# Patient Record
Sex: FEMALE | Race: WHITE | NOT HISPANIC OR LATINO
[De-identification: names, ages, dates, MRNs, and addresses within clinical notes are randomized per-mention and may not be internally consistent; named-entity substitution may affect disease eponyms.]

---

## 2022-02-10 ENCOUNTER — TRANSCRIPTION ENCOUNTER (OUTPATIENT)
Age: 47
End: 2022-02-10

## 2023-01-24 ENCOUNTER — APPOINTMENT (OUTPATIENT)
Dept: INTERNAL MEDICINE | Facility: CLINIC | Age: 48
End: 2023-01-24

## 2023-01-27 ENCOUNTER — APPOINTMENT (OUTPATIENT)
Dept: INTERNAL MEDICINE | Facility: CLINIC | Age: 48
End: 2023-01-27

## 2023-02-04 ENCOUNTER — NON-APPOINTMENT (OUTPATIENT)
Age: 48
End: 2023-02-04

## 2023-02-04 ENCOUNTER — APPOINTMENT (OUTPATIENT)
Dept: INTERNAL MEDICINE | Facility: CLINIC | Age: 48
End: 2023-02-04
Payer: COMMERCIAL

## 2023-02-04 VITALS
RESPIRATION RATE: 14 BRPM | DIASTOLIC BLOOD PRESSURE: 82 MMHG | HEART RATE: 61 BPM | HEIGHT: 65 IN | OXYGEN SATURATION: 99 % | SYSTOLIC BLOOD PRESSURE: 130 MMHG | BODY MASS INDEX: 33.99 KG/M2 | TEMPERATURE: 98.2 F | WEIGHT: 204 LBS

## 2023-02-04 DIAGNOSIS — M54.16 RADICULOPATHY, LUMBAR REGION: ICD-10-CM

## 2023-02-04 DIAGNOSIS — M22.42 CHONDROMALACIA PATELLAE, LEFT KNEE: ICD-10-CM

## 2023-02-04 DIAGNOSIS — Z82.49 FAMILY HISTORY OF ISCHEMIC HEART DISEASE AND OTHER DISEASES OF THE CIRCULATORY SYSTEM: ICD-10-CM

## 2023-02-04 DIAGNOSIS — Z83.3 FAMILY HISTORY OF DIABETES MELLITUS: ICD-10-CM

## 2023-02-04 PROCEDURE — 93000 ELECTROCARDIOGRAM COMPLETE: CPT | Mod: 59

## 2023-02-04 PROCEDURE — G0444 DEPRESSION SCREEN ANNUAL: CPT | Mod: 59

## 2023-02-04 PROCEDURE — 99386 PREV VISIT NEW AGE 40-64: CPT | Mod: 25

## 2023-02-04 NOTE — PLAN
[FreeTextEntry1] : HCM \par - Up to date with pap smear \par - Due for mammogram- script provided \par - Up to date with flu vaccine \par - Due for colonoscopy- referred to colorectal surgery Dr Nowak \par - Check routine labs \par \par Type 2 DM \par - Pt counselled on importance of compliance with medication, can find alternate timing for medication if need be\par - Check A1c, urine microalbumin \par - Referred to optho for retinal exam, podiatry for foot exam \par \par Bradycardia \par - Reports remote history earlier in the summer, experiences intermittent lightheadedness \par - EKG shows sinus bradycardia, HR 49, low voltage. Pt displays chronotropic competence, able to walk around and heart rate increases to 60s \par - Referred to cardiology at [facility] \par \par

## 2023-02-04 NOTE — PHYSICAL EXAM
[No JVD] : no jugular venous distention [No Lymphadenopathy] : no lymphadenopathy [Supple] : supple [Thyroid Normal, No Nodules] : the thyroid was normal and there were no nodules present [No Edema] : there was no peripheral edema [Soft] : abdomen soft [Non Tender] : non-tender [Non-distended] : non-distended [Normal] : affect was normal and insight and judgment were intact [de-identified] : +post surgical scar noted on anterior neck

## 2023-02-04 NOTE — HEALTH RISK ASSESSMENT
[Good] : ~his/her~  mood as  good [No] : No [0] : 2) Feeling down, depressed, or hopeless: Not at all (0) [Patient reported mammogram was normal] : Patient reported mammogram was normal [Patient reported PAP Smear was normal] : Patient reported PAP Smear was normal [With Family] : lives with family [Employed] : employed [] :  [# Of Children ___] : has [unfilled] children [Fully functional (bathing, dressing, toileting, transferring, walking, feeding)] : Fully functional (bathing, dressing, toileting, transferring, walking, feeding) [Fully functional (using the telephone, shopping, preparing meals, housekeeping, doing laundry, using] : Fully functional and needs no help or supervision to perform IADLs (using the telephone, shopping, preparing meals, housekeeping, doing laundry, using transportation, managing medications and managing finances) [Never] : Never [[id number]] : 0 [MammogramDate] : 01/21 [PapSmearDate] : 08/22

## 2023-02-04 NOTE — HISTORY OF PRESENT ILLNESS
[FreeTextEntry1] : establish care  [de-identified] : Patient is a 47 year old female with PMH type 2 DM who presents today to establish care. Patient states that she has not been very compliant with her diabetes medication as of late as she tends to not have time to eat and therefore does not take her medication as when she takes it without eating it makes her have stomach pains and diarrhea. \par \par Patient also reports a remote history of bradycardia. She states she was told her heart rate was low postoperatively after her cervical spine fusion in the summer of 2022. She does not feel any skipped beats but does occasionally feel lightheaded.

## 2023-02-06 LAB
25(OH)D3 SERPL-MCNC: 20.8 NG/ML
ALBUMIN SERPL ELPH-MCNC: 4.1 G/DL
ALP BLD-CCNC: 93 U/L
ALT SERPL-CCNC: 17 U/L
ANION GAP SERPL CALC-SCNC: 12 MMOL/L
APPEARANCE: ABNORMAL
AST SERPL-CCNC: 14 U/L
BACTERIA: NEGATIVE
BASOPHILS # BLD AUTO: 0.06 K/UL
BASOPHILS NFR BLD AUTO: 0.6 %
BILIRUB SERPL-MCNC: 0.7 MG/DL
BILIRUBIN URINE: NEGATIVE
BLOOD URINE: ABNORMAL
BUN SERPL-MCNC: 7 MG/DL
CALCIUM SERPL-MCNC: 9.1 MG/DL
CHLORIDE SERPL-SCNC: 100 MMOL/L
CHOLEST SERPL-MCNC: 202 MG/DL
CO2 SERPL-SCNC: 23 MMOL/L
COLOR: ABNORMAL
CREAT SERPL-MCNC: 0.58 MG/DL
CREAT SPEC-SCNC: 232 MG/DL
EGFR: 114 ML/MIN/1.73M2
EOSINOPHIL # BLD AUTO: 0.31 K/UL
EOSINOPHIL NFR BLD AUTO: 3 %
ESTIMATED AVERAGE GLUCOSE: 214 MG/DL
FOLATE SERPL-MCNC: 6.9 NG/ML
GLUCOSE QUALITATIVE U: ABNORMAL
GLUCOSE SERPL-MCNC: 188 MG/DL
HBA1C MFR BLD HPLC: 9.1 %
HCT VFR BLD CALC: 41.6 %
HDLC SERPL-MCNC: 41 MG/DL
HGB BLD-MCNC: 13.3 G/DL
HYALINE CASTS: 1 /LPF
IMM GRANULOCYTES NFR BLD AUTO: 0.5 %
KETONES URINE: NORMAL
LDLC SERPL CALC-MCNC: 141 MG/DL
LEUKOCYTE ESTERASE URINE: NEGATIVE
LYMPHOCYTES # BLD AUTO: 2.05 K/UL
LYMPHOCYTES NFR BLD AUTO: 20.1 %
MAN DIFF?: NORMAL
MCHC RBC-ENTMCNC: 28.4 PG
MCHC RBC-ENTMCNC: 32 GM/DL
MCV RBC AUTO: 88.7 FL
MICROALBUMIN 24H UR DL<=1MG/L-MCNC: 5.3 MG/DL
MICROALBUMIN/CREAT 24H UR-RTO: 23 MG/G
MICROSCOPIC-UA: NORMAL
MONOCYTES # BLD AUTO: 0.68 K/UL
MONOCYTES NFR BLD AUTO: 6.7 %
NEUTROPHILS # BLD AUTO: 7.05 K/UL
NEUTROPHILS NFR BLD AUTO: 69.1 %
NITRITE URINE: NEGATIVE
NONHDLC SERPL-MCNC: 161 MG/DL
PH URINE: 5.5
PLATELET # BLD AUTO: 391 K/UL
POTASSIUM SERPL-SCNC: 4.3 MMOL/L
PROT SERPL-MCNC: 7 G/DL
PROTEIN URINE: ABNORMAL
RBC # BLD: 4.69 M/UL
RBC # FLD: 14.3 %
RED BLOOD CELLS URINE: 1 /HPF
SODIUM SERPL-SCNC: 135 MMOL/L
SPECIFIC GRAVITY URINE: 1.03
SQUAMOUS EPITHELIAL CELLS: 3 /HPF
TRIGL SERPL-MCNC: 100 MG/DL
TSH SERPL-ACNC: 0.57 UIU/ML
URINE COMMENTS: NORMAL
UROBILINOGEN URINE: NORMAL
VIT B12 SERPL-MCNC: 416 PG/ML
WBC # FLD AUTO: 10.2 K/UL
WHITE BLOOD CELLS URINE: 1 /HPF

## 2023-02-06 RX ORDER — MELOXICAM 15 MG/1
15 TABLET ORAL
Qty: 30 | Refills: 0 | Status: DISCONTINUED | COMMUNITY
Start: 2022-10-06

## 2023-02-06 RX ORDER — METHYLPREDNISOLONE 4 MG/1
4 TABLET ORAL
Qty: 21 | Refills: 0 | Status: DISCONTINUED | COMMUNITY
Start: 2022-09-07

## 2023-02-06 RX ORDER — METHOCARBAMOL 500 MG/1
500 TABLET, FILM COATED ORAL
Qty: 30 | Refills: 0 | Status: DISCONTINUED | COMMUNITY
Start: 2022-10-06

## 2023-02-06 RX ORDER — SEMAGLUTIDE 1.34 MG/ML
4 INJECTION, SOLUTION SUBCUTANEOUS
Refills: 0 | Status: DISCONTINUED | COMMUNITY
Start: 2023-02-04 | End: 2023-02-06

## 2023-02-06 RX ORDER — BENZONATATE 100 MG/1
100 CAPSULE ORAL
Qty: 30 | Refills: 0 | Status: DISCONTINUED | COMMUNITY
Start: 2022-11-02

## 2023-02-06 RX ORDER — MULTIVITAMIN WITH FOLIC ACID 400 MCG
TABLET ORAL
Qty: 30 | Refills: 0 | Status: DISCONTINUED | COMMUNITY
Start: 2022-04-30

## 2023-02-06 RX ORDER — BLOOD SUGAR DIAGNOSTIC
STRIP MISCELLANEOUS
Qty: 100 | Refills: 0 | Status: DISCONTINUED | COMMUNITY
Start: 2022-07-13

## 2023-02-06 RX ORDER — AZITHROMYCIN 250 MG/1
250 TABLET, FILM COATED ORAL
Qty: 6 | Refills: 0 | Status: DISCONTINUED | COMMUNITY
Start: 2022-11-03

## 2023-02-06 RX ORDER — LISINOPRIL 2.5 MG/1
2.5 TABLET ORAL
Qty: 30 | Refills: 0 | Status: DISCONTINUED | COMMUNITY
Start: 2022-08-24

## 2023-02-06 RX ORDER — ERGOCALCIFEROL 1.25 MG/1
1.25 MG CAPSULE, LIQUID FILLED ORAL
Qty: 4 | Refills: 0 | Status: DISCONTINUED | COMMUNITY
Start: 2022-04-30

## 2023-02-06 RX ORDER — CHLORHEXIDINE GLUCONATE 4 %
1000 LIQUID (ML) TOPICAL
Qty: 30 | Refills: 0 | Status: DISCONTINUED | COMMUNITY
Start: 2022-04-30

## 2023-02-06 RX ORDER — OXYCODONE AND ACETAMINOPHEN 5; 325 MG/1; MG/1
5-325 TABLET ORAL
Qty: 50 | Refills: 0 | Status: DISCONTINUED | COMMUNITY
Start: 2022-08-16

## 2023-04-05 ENCOUNTER — APPOINTMENT (OUTPATIENT)
Dept: INTERNAL MEDICINE | Facility: CLINIC | Age: 48
End: 2023-04-05
Payer: COMMERCIAL

## 2023-04-05 VITALS
BODY MASS INDEX: 34.32 KG/M2 | OXYGEN SATURATION: 98 % | SYSTOLIC BLOOD PRESSURE: 138 MMHG | RESPIRATION RATE: 14 BRPM | WEIGHT: 206 LBS | HEIGHT: 65 IN | DIASTOLIC BLOOD PRESSURE: 80 MMHG | HEART RATE: 64 BPM

## 2023-04-05 DIAGNOSIS — R22.9 LOCALIZED SWELLING, MASS AND LUMP, UNSPECIFIED: ICD-10-CM

## 2023-04-05 DIAGNOSIS — L29.9 PRURITUS, UNSPECIFIED: ICD-10-CM

## 2023-04-05 DIAGNOSIS — L30.9 DERMATITIS, UNSPECIFIED: ICD-10-CM

## 2023-04-05 PROCEDURE — 99213 OFFICE O/P EST LOW 20 MIN: CPT

## 2023-04-05 NOTE — REVIEW OF SYSTEMS
[Negative] : Psychiatric [FreeTextEntry8] : see hpi  [de-identified] : see hpi  [FreeTextEntry9] : see hpi

## 2023-04-05 NOTE — PLAN
[FreeTextEntry1] : Nodules on hand:unclear etiology, possibly secondary to ganglion cysts vs heberdens nodes vs soft tissue lipomas. Will obtain Xray of hand\par \par Vaginal itching: likely secondary to yeast infection. Start diflucan. \par \par Dermatitis/ear itching: possibly secondary to eczema, ?psoriasiis flareup. Start triamcinolone cream. F/u derm if no improvement.

## 2023-04-05 NOTE — PHYSICAL EXAM
[Normal TMs] : both tympanic membranes were normal [Normal] : affect was normal and insight and judgment were intact [de-identified] : dry patch noted on tragus of left ear [de-identified] : small soft nodules noted on left fourth digit and second digit of left hand

## 2023-04-05 NOTE — HISTORY OF PRESENT ILLNESS
[FreeTextEntry8] : 48 year old female who presents today with a few concerns. She states that she has noticed some nodules on the fingers of her left hand. She states that it has been going on and off for several months. She states they are not painful, occasionally she has some trouble bending the fingers but otherwise is able to do so. \par \par Patient has also noticed a dry itchy rash on ear lobe and tragal area. This has been going on for several months. She uses hydrocortisone cream with minimal improvement. \par \par Patient states she has been having vaginal itching. Has history of yeast infections.

## 2023-04-26 ENCOUNTER — APPOINTMENT (OUTPATIENT)
Dept: INTERNAL MEDICINE | Facility: CLINIC | Age: 48
End: 2023-04-26
Payer: COMMERCIAL

## 2023-04-26 VITALS
TEMPERATURE: 98.1 F | RESPIRATION RATE: 14 BRPM | DIASTOLIC BLOOD PRESSURE: 70 MMHG | BODY MASS INDEX: 34.32 KG/M2 | HEART RATE: 66 BPM | HEIGHT: 65 IN | WEIGHT: 206 LBS | OXYGEN SATURATION: 98 % | SYSTOLIC BLOOD PRESSURE: 120 MMHG

## 2023-04-26 DIAGNOSIS — M62.830 MUSCLE SPASM OF BACK: ICD-10-CM

## 2023-04-26 DIAGNOSIS — Z87.898 PERSONAL HISTORY OF OTHER SPECIFIED CONDITIONS: ICD-10-CM

## 2023-04-26 DIAGNOSIS — Z23 ENCOUNTER FOR IMMUNIZATION: ICD-10-CM

## 2023-04-26 PROCEDURE — 99213 OFFICE O/P EST LOW 20 MIN: CPT | Mod: 25

## 2023-04-26 PROCEDURE — 90471 IMMUNIZATION ADMIN: CPT

## 2023-04-26 PROCEDURE — 90715 TDAP VACCINE 7 YRS/> IM: CPT

## 2023-04-26 RX ORDER — FLUCONAZOLE 150 MG/1
150 TABLET ORAL
Qty: 2 | Refills: 0 | Status: DISCONTINUED | COMMUNITY
Start: 2023-04-05 | End: 2023-04-26

## 2023-04-26 RX ORDER — TRIAMCINOLONE ACETONIDE 1 MG/G
0.1 CREAM TOPICAL 3 TIMES DAILY
Qty: 1 | Refills: 0 | Status: DISCONTINUED | COMMUNITY
Start: 2023-04-05 | End: 2023-04-26

## 2023-04-26 NOTE — HISTORY OF PRESENT ILLNESS
[FreeTextEntry8] : 48 year old female who presents today with complaint of lower back pain. It started about 2 weeks ago. It is localized to her left lower back. Does not radiate anywhere. Denies fevers, chills, numbness, tingling, saddle anesthesia, bowel or bladder incontinence. She took Aleve with minimal relief. She is mostly sitting during the day at work. \par \par Patient would like Tdap vaccine today.

## 2023-04-26 NOTE — PLAN
[FreeTextEntry1] : Symptoms consistent with lower back muscle spasm. Start Flexeril 10 mg TID PRN, naproxen 500 mg BID and lidocaine patch. Follow up if no improvement. \par \par Tdap administered in office today, no adverse reactions.

## 2023-04-26 NOTE — PHYSICAL EXAM
[No Edema] : there was no peripheral edema [No CVA Tenderness] : no CVA  tenderness [No Spinal Tenderness] : no spinal tenderness [Normal] : affect was normal and insight and judgment were intact [de-identified] : increased hypertonicity of left lumbar paraspinal muscles

## 2023-08-03 ENCOUNTER — APPOINTMENT (OUTPATIENT)
Dept: DERMATOLOGY | Facility: CLINIC | Age: 48
End: 2023-08-03
Payer: COMMERCIAL

## 2023-08-03 DIAGNOSIS — L82.1 OTHER SEBORRHEIC KERATOSIS: ICD-10-CM

## 2023-08-03 DIAGNOSIS — L40.9 PSORIASIS, UNSPECIFIED: ICD-10-CM

## 2023-08-03 DIAGNOSIS — L23.5 ALLERGIC CONTACT DERMATITIS DUE TO OTHER CHEMICAL PRODUCTS: ICD-10-CM

## 2023-08-03 PROCEDURE — 99204 OFFICE O/P NEW MOD 45 MIN: CPT

## 2023-08-03 NOTE — HISTORY OF PRESENT ILLNESS
[FreeTextEntry1] : rash; psoriasis [de-identified] : 48F with a hx of psoriasis here for rash on the neck and L ear. Present x months. Itchy. No new topical contacts. Uses hair dye Also with a hx of psoriasis -- previously treated with ILK. Has one area on the R knee that is itchy and flaring.  Also curious about bumps on the face that are increasing in number.

## 2023-08-03 NOTE — PHYSICAL EXAM
[Alert] : alert [Oriented x 3] : ~L oriented x 3 [Well Nourished] : well nourished [Conjunctiva Non-injected] : conjunctiva non-injected [No Visual Lymphadenopathy] : no visual  lymphadenopathy [No Clubbing] : no clubbing [No Edema] : no edema [No Bromhidrosis] : no bromhidrosis [No Chromhidrosis] : no chromhidrosis [FreeTextEntry3] : Stuck-on brown papules on the face Lichenified plaques on the ear and neck R knee with well-circumscribed erythematous plaque

## 2023-08-07 ENCOUNTER — APPOINTMENT (OUTPATIENT)
Dept: INTERNAL MEDICINE | Facility: CLINIC | Age: 48
End: 2023-08-07
Payer: COMMERCIAL

## 2023-08-07 VITALS
HEART RATE: 74 BPM | BODY MASS INDEX: 34.99 KG/M2 | TEMPERATURE: 97.9 F | RESPIRATION RATE: 14 BRPM | WEIGHT: 210 LBS | HEIGHT: 65 IN | OXYGEN SATURATION: 97 % | DIASTOLIC BLOOD PRESSURE: 70 MMHG | SYSTOLIC BLOOD PRESSURE: 120 MMHG

## 2023-08-07 PROCEDURE — 99214 OFFICE O/P EST MOD 30 MIN: CPT

## 2023-08-07 NOTE — HISTORY OF PRESENT ILLNESS
[FreeTextEntry8] : Pt would like to decrease the dose of ozempic due to GI side effects. Pt reports that her new insurance doesn't cover farxiga. She hasn't tolerated metformin in the past

## 2023-09-06 ENCOUNTER — APPOINTMENT (OUTPATIENT)
Dept: INTERNAL MEDICINE | Facility: CLINIC | Age: 48
End: 2023-09-06

## 2023-09-12 ENCOUNTER — RX RENEWAL (OUTPATIENT)
Age: 48
End: 2023-09-12

## 2023-10-26 ENCOUNTER — APPOINTMENT (OUTPATIENT)
Dept: DERMATOLOGY | Facility: CLINIC | Age: 48
End: 2023-10-26

## 2023-11-03 ENCOUNTER — APPOINTMENT (OUTPATIENT)
Dept: OBGYN | Facility: CLINIC | Age: 48
End: 2023-11-03
Payer: COMMERCIAL

## 2023-11-03 VITALS
BODY MASS INDEX: 34.16 KG/M2 | HEIGHT: 65 IN | WEIGHT: 205 LBS | DIASTOLIC BLOOD PRESSURE: 78 MMHG | SYSTOLIC BLOOD PRESSURE: 120 MMHG

## 2023-11-03 DIAGNOSIS — Z01.419 ENCOUNTER FOR GYNECOLOGICAL EXAMINATION (GENERAL) (ROUTINE) W/OUT ABNORMAL FINDINGS: ICD-10-CM

## 2023-11-03 DIAGNOSIS — N89.8 OTHER SPECIFIED NONINFLAMMATORY DISORDERS OF VAGINA: ICD-10-CM

## 2023-11-03 PROCEDURE — 99386 PREV VISIT NEW AGE 40-64: CPT

## 2023-11-03 RX ORDER — NYSTATIN AND TRIAMCINOLONE ACETONIDE 100000; 1 [USP'U]/G; MG/G
100000-0.1 OINTMENT TOPICAL TWICE DAILY
Qty: 1 | Refills: 0 | Status: ACTIVE | COMMUNITY
Start: 2023-11-03 | End: 1900-01-01

## 2023-11-05 LAB — HPV HIGH+LOW RISK DNA PNL CVX: NOT DETECTED

## 2023-11-07 LAB — CYTOLOGY CVX/VAG DOC THIN PREP: NORMAL

## 2024-01-17 ENCOUNTER — RX RENEWAL (OUTPATIENT)
Age: 49
End: 2024-01-17

## 2024-02-21 ENCOUNTER — APPOINTMENT (OUTPATIENT)
Dept: INTERNAL MEDICINE | Facility: CLINIC | Age: 49
End: 2024-02-21
Payer: COMMERCIAL

## 2024-02-21 VITALS
SYSTOLIC BLOOD PRESSURE: 118 MMHG | HEART RATE: 86 BPM | TEMPERATURE: 98.3 F | OXYGEN SATURATION: 99 % | RESPIRATION RATE: 14 BRPM | DIASTOLIC BLOOD PRESSURE: 70 MMHG | HEIGHT: 65 IN | WEIGHT: 202 LBS | BODY MASS INDEX: 33.66 KG/M2

## 2024-02-21 DIAGNOSIS — E78.5 HYPERLIPIDEMIA, UNSPECIFIED: ICD-10-CM

## 2024-02-21 DIAGNOSIS — E11.9 TYPE 2 DIABETES MELLITUS W/OUT COMPLICATIONS: ICD-10-CM

## 2024-02-21 DIAGNOSIS — E55.9 VITAMIN D DEFICIENCY, UNSPECIFIED: ICD-10-CM

## 2024-02-21 DIAGNOSIS — Z00.00 ENCOUNTER FOR GENERAL ADULT MEDICAL EXAMINATION W/OUT ABNORMAL FINDINGS: ICD-10-CM

## 2024-02-21 LAB
25(OH)D3 SERPL-MCNC: 15.6 NG/ML
ALBUMIN SERPL ELPH-MCNC: 3.9 G/DL
ALP BLD-CCNC: 83 U/L
ALT SERPL-CCNC: 17 U/L
ANION GAP SERPL CALC-SCNC: 11 MMOL/L
AST SERPL-CCNC: 12 U/L
BILIRUB SERPL-MCNC: 0.7 MG/DL
BUN SERPL-MCNC: 6 MG/DL
CALCIUM SERPL-MCNC: 8.7 MG/DL
CHLORIDE SERPL-SCNC: 103 MMOL/L
CHOLEST SERPL-MCNC: 191 MG/DL
CO2 SERPL-SCNC: 25 MMOL/L
CREAT SERPL-MCNC: 0.58 MG/DL
EGFR: 113 ML/MIN/1.73M2
ESTIMATED AVERAGE GLUCOSE: 169 MG/DL
FOLATE SERPL-MCNC: 4.7 NG/ML
GLUCOSE SERPL-MCNC: 124 MG/DL
HBA1C MFR BLD HPLC: 7.5 %
HCT VFR BLD CALC: 40.2 %
HDLC SERPL-MCNC: 52 MG/DL
HGB BLD-MCNC: 12.7 G/DL
LDLC SERPL CALC-MCNC: 123 MG/DL
MCHC RBC-ENTMCNC: 27.4 PG
MCHC RBC-ENTMCNC: 31.6 GM/DL
MCV RBC AUTO: 86.8 FL
NONHDLC SERPL-MCNC: 139 MG/DL
PLATELET # BLD AUTO: 397 K/UL
POTASSIUM SERPL-SCNC: 4.1 MMOL/L
PROT SERPL-MCNC: 6.9 G/DL
RBC # BLD: 4.63 M/UL
RBC # FLD: 14.7 %
SODIUM SERPL-SCNC: 139 MMOL/L
TRIGL SERPL-MCNC: 89 MG/DL
TSH SERPL-ACNC: 0.97 UIU/ML
VIT B12 SERPL-MCNC: 428 PG/ML
WBC # FLD AUTO: 9.86 K/UL

## 2024-02-21 PROCEDURE — 99396 PREV VISIT EST AGE 40-64: CPT

## 2024-02-21 RX ORDER — CYCLOBENZAPRINE HYDROCHLORIDE 10 MG/1
10 TABLET, FILM COATED ORAL 3 TIMES DAILY
Qty: 21 | Refills: 0 | Status: DISCONTINUED | COMMUNITY
Start: 2023-04-26 | End: 2024-02-21

## 2024-02-21 RX ORDER — ROSUVASTATIN CALCIUM 10 MG/1
10 TABLET, FILM COATED ORAL
Qty: 90 | Refills: 3 | Status: ACTIVE | COMMUNITY
Start: 2023-02-06 | End: 1900-01-01

## 2024-02-21 RX ORDER — EMPAGLIFLOZIN 25 MG/1
25 TABLET, FILM COATED ORAL DAILY
Qty: 90 | Refills: 1 | Status: ACTIVE | COMMUNITY
Start: 2024-02-21 | End: 1900-01-01

## 2024-02-21 RX ORDER — LIDOCAINE 5% 700 MG/1
5 PATCH TOPICAL
Qty: 7 | Refills: 0 | Status: DISCONTINUED | COMMUNITY
Start: 2023-04-26 | End: 2024-02-21

## 2024-02-21 RX ORDER — MOMETASONE FUROATE 1 MG/G
0.1 OINTMENT TOPICAL
Qty: 1 | Refills: 1 | Status: DISCONTINUED | COMMUNITY
Start: 2023-08-03 | End: 2024-02-21

## 2024-02-21 RX ORDER — EMPAGLIFLOZIN 10 MG/1
10 TABLET, FILM COATED ORAL DAILY
Qty: 30 | Refills: 0 | Status: DISCONTINUED | COMMUNITY
Start: 2023-08-04 | End: 2024-02-21

## 2024-02-21 RX ORDER — NAPROXEN 500 MG/1
500 TABLET ORAL
Qty: 20 | Refills: 0 | Status: DISCONTINUED | COMMUNITY
Start: 2023-04-26 | End: 2024-02-21

## 2024-04-29 ENCOUNTER — RX RENEWAL (OUTPATIENT)
Age: 49
End: 2024-04-29

## 2024-04-29 RX ORDER — SEMAGLUTIDE 0.68 MG/ML
2 INJECTION, SOLUTION SUBCUTANEOUS
Qty: 3 | Refills: 0 | Status: ACTIVE | COMMUNITY
Start: 2023-02-06 | End: 1900-01-01

## 2024-04-29 RX ORDER — ERGOCALCIFEROL 1.25 MG/1
1.25 MG CAPSULE, LIQUID FILLED ORAL
Qty: 8 | Refills: 0 | Status: ACTIVE | COMMUNITY
Start: 2024-02-21 | End: 1900-01-01

## 2024-07-11 ENCOUNTER — RX RENEWAL (OUTPATIENT)
Age: 49
End: 2024-07-11

## 2024-08-08 ENCOUNTER — RX RENEWAL (OUTPATIENT)
Age: 49
End: 2024-08-08

## 2024-08-22 ENCOUNTER — APPOINTMENT (OUTPATIENT)
Dept: INTERNAL MEDICINE | Facility: CLINIC | Age: 49
End: 2024-08-22
Payer: COMMERCIAL

## 2024-08-22 VITALS
WEIGHT: 205 LBS | TEMPERATURE: 97.9 F | HEART RATE: 61 BPM | SYSTOLIC BLOOD PRESSURE: 146 MMHG | DIASTOLIC BLOOD PRESSURE: 82 MMHG | BODY MASS INDEX: 34.16 KG/M2 | RESPIRATION RATE: 14 BRPM | OXYGEN SATURATION: 99 % | HEIGHT: 65 IN

## 2024-08-22 VITALS — SYSTOLIC BLOOD PRESSURE: 122 MMHG | DIASTOLIC BLOOD PRESSURE: 64 MMHG

## 2024-08-22 DIAGNOSIS — E11.9 TYPE 2 DIABETES MELLITUS W/OUT COMPLICATIONS: ICD-10-CM

## 2024-08-22 DIAGNOSIS — R51.9 HEADACHE, UNSPECIFIED: ICD-10-CM

## 2024-08-22 DIAGNOSIS — E78.5 HYPERLIPIDEMIA, UNSPECIFIED: ICD-10-CM

## 2024-08-22 PROCEDURE — 99214 OFFICE O/P EST MOD 30 MIN: CPT

## 2024-08-22 PROCEDURE — G2211 COMPLEX E/M VISIT ADD ON: CPT

## 2024-08-22 RX ORDER — NAPROXEN 500 MG/1
500 TABLET ORAL
Qty: 30 | Refills: 0 | Status: ACTIVE | COMMUNITY
Start: 2024-08-22 | End: 1900-01-01

## 2024-08-22 NOTE — ASSESSMENT
[FreeTextEntry1] : HLD: Check lipids. Urged her to restart rosuvastatin 10mg daily.  DM2: Check a1c, alb/cr. Continue jardiance 25mg daily, ozempic .5mg weekly.  Headache: No focal signs. Discussed naproxen 500mg BID with food for 3 days. Tylenol 1000mg TID PRN. Follow-up if persistent. Check labs to rule out metabolic causes.

## 2024-08-22 NOTE — REVIEW OF SYSTEMS
[Fever] : no fever [Chills] : no chills [Night Sweats] : no night sweats [Chest Pain] : no chest pain [Palpitations] : no palpitations [Lower Ext Edema] : no lower extremity edema [Shortness Of Breath] : no shortness of breath [Wheezing] : no wheezing [Cough] : no cough [Dyspnea on Exertion] : no dyspnea on exertion [Abdominal Pain] : no abdominal pain [Nausea] : no nausea [Constipation] : no constipation [Diarrhea] : diarrhea [Vomiting] : no vomiting [Dysuria] : no dysuria [Headache] : headache [Dizziness] : no dizziness

## 2024-08-22 NOTE — HISTORY OF PRESENT ILLNESS
[de-identified] : 49F presents for follow-up of DM2, HLD. Has been taking ozempic and jardiance. Stopped taking rosuvastatin. Due for bloodwork. Also reports 1 week of headache. The headache is centered around the eyes. Denies dizziness, vertigo, weakness, numbness. Has tried OTC analgesics which have not helped.

## 2024-08-26 LAB
ALBUMIN SERPL ELPH-MCNC: 4.2 G/DL
ALP BLD-CCNC: 91 U/L
ALT SERPL-CCNC: 20 U/L
ANION GAP SERPL CALC-SCNC: 12 MMOL/L
AST SERPL-CCNC: 15 U/L
BASOPHILS # BLD AUTO: 0.08 K/UL
BASOPHILS NFR BLD AUTO: 0.8 %
BILIRUB SERPL-MCNC: 0.4 MG/DL
BUN SERPL-MCNC: 8 MG/DL
CALCIUM SERPL-MCNC: 9.4 MG/DL
CHLORIDE SERPL-SCNC: 104 MMOL/L
CHOLEST SERPL-MCNC: 234 MG/DL
CO2 SERPL-SCNC: 23 MMOL/L
CREAT SERPL-MCNC: 0.58 MG/DL
CREAT SPEC-SCNC: 78 MG/DL
EGFR: 112 ML/MIN/1.73M2
EOSINOPHIL # BLD AUTO: 0.36 K/UL
EOSINOPHIL NFR BLD AUTO: 3.7 %
ESTIMATED AVERAGE GLUCOSE: 180 MG/DL
FOLATE SERPL-MCNC: 5.4 NG/ML
GLUCOSE SERPL-MCNC: 123 MG/DL
HBA1C MFR BLD HPLC: 7.9 %
HCT VFR BLD CALC: 44 %
HDLC SERPL-MCNC: 53 MG/DL
HGB BLD-MCNC: 14 G/DL
IMM GRANULOCYTES NFR BLD AUTO: 0.7 %
LDLC SERPL CALC-MCNC: 156 MG/DL
LYMPHOCYTES # BLD AUTO: 2.42 K/UL
LYMPHOCYTES NFR BLD AUTO: 25.1 %
MAN DIFF?: NORMAL
MCHC RBC-ENTMCNC: 27.7 PG
MCHC RBC-ENTMCNC: 31.8 GM/DL
MCV RBC AUTO: 87.1 FL
MICROALBUMIN 24H UR DL<=1MG/L-MCNC: 1.9 MG/DL
MICROALBUMIN/CREAT 24H UR-RTO: 25 MG/G
MONOCYTES # BLD AUTO: 0.7 K/UL
MONOCYTES NFR BLD AUTO: 7.3 %
NEUTROPHILS # BLD AUTO: 6.02 K/UL
NEUTROPHILS NFR BLD AUTO: 62.4 %
NONHDLC SERPL-MCNC: 180 MG/DL
PLATELET # BLD AUTO: 405 K/UL
POTASSIUM SERPL-SCNC: 4.4 MMOL/L
PROT SERPL-MCNC: 7.5 G/DL
RBC # BLD: 5.05 M/UL
RBC # FLD: 15 %
SODIUM SERPL-SCNC: 139 MMOL/L
TRIGL SERPL-MCNC: 136 MG/DL
TSH SERPL-ACNC: 1.13 UIU/ML
VIT B12 SERPL-MCNC: 382 PG/ML
WBC # FLD AUTO: 9.65 K/UL

## 2024-09-22 ENCOUNTER — INPATIENT (INPATIENT)
Facility: HOSPITAL | Age: 49
LOS: 1 days | Discharge: HOME | DRG: 866 | End: 2024-09-24
Attending: STUDENT IN AN ORGANIZED HEALTH CARE EDUCATION/TRAINING PROGRAM | Admitting: INTERNAL MEDICINE
Payer: COMMERCIAL

## 2024-09-22 VITALS
RESPIRATION RATE: 16 BRPM | OXYGEN SATURATION: 99 % | HEART RATE: 96 BPM | DIASTOLIC BLOOD PRESSURE: 63 MMHG | SYSTOLIC BLOOD PRESSURE: 117 MMHG | HEIGHT: 65 IN | TEMPERATURE: 102 F | WEIGHT: 205.91 LBS

## 2024-09-22 DIAGNOSIS — R65.10 SYSTEMIC INFLAMMATORY RESPONSE SYNDROME (SIRS) OF NON-INFECTIOUS ORIGIN WITHOUT ACUTE ORGAN DYSFUNCTION: ICD-10-CM

## 2024-09-22 LAB
ALBUMIN SERPL ELPH-MCNC: 3.2 G/DL — LOW (ref 3.3–5)
ALP SERPL-CCNC: 87 U/L — SIGNIFICANT CHANGE UP (ref 40–120)
ALT FLD-CCNC: 28 U/L — SIGNIFICANT CHANGE UP (ref 12–78)
ANION GAP SERPL CALC-SCNC: 8 MMOL/L — SIGNIFICANT CHANGE UP (ref 5–17)
APPEARANCE UR: CLEAR — SIGNIFICANT CHANGE UP
APTT BLD: 28.7 SEC — SIGNIFICANT CHANGE UP (ref 24.5–35.6)
AST SERPL-CCNC: 17 U/L — SIGNIFICANT CHANGE UP (ref 15–37)
BILIRUB SERPL-MCNC: 0.6 MG/DL — SIGNIFICANT CHANGE UP (ref 0.2–1.2)
BILIRUB UR-MCNC: NEGATIVE — SIGNIFICANT CHANGE UP
BUN SERPL-MCNC: 9 MG/DL — SIGNIFICANT CHANGE UP (ref 7–23)
CALCIUM SERPL-MCNC: 8.6 MG/DL — SIGNIFICANT CHANGE UP (ref 8.5–10.1)
CHLORIDE SERPL-SCNC: 106 MMOL/L — SIGNIFICANT CHANGE UP (ref 96–108)
CO2 SERPL-SCNC: 24 MMOL/L — SIGNIFICANT CHANGE UP (ref 22–31)
COLOR SPEC: YELLOW — SIGNIFICANT CHANGE UP
CREAT SERPL-MCNC: 0.68 MG/DL — SIGNIFICANT CHANGE UP (ref 0.5–1.3)
DIFF PNL FLD: NEGATIVE — SIGNIFICANT CHANGE UP
EGFR: 108 ML/MIN/1.73M2 — SIGNIFICANT CHANGE UP
GLUCOSE SERPL-MCNC: 184 MG/DL — HIGH (ref 70–99)
GLUCOSE UR QL: >=1000 MG/DL
HCG SERPL-ACNC: <1 MIU/ML — SIGNIFICANT CHANGE UP
HCT VFR BLD CALC: 40.3 % — SIGNIFICANT CHANGE UP (ref 34.5–45)
HGB BLD-MCNC: 12.7 G/DL — SIGNIFICANT CHANGE UP (ref 11.5–15.5)
INR BLD: 1.24 RATIO — HIGH (ref 0.85–1.18)
KETONES UR-MCNC: 15 MG/DL
LACTATE SERPL-SCNC: 1.6 MMOL/L — SIGNIFICANT CHANGE UP (ref 0.7–2)
LACTATE SERPL-SCNC: 2.2 MMOL/L — HIGH (ref 0.7–2)
LACTATE SERPL-SCNC: 2.8 MMOL/L — HIGH (ref 0.7–2)
LEUKOCYTE ESTERASE UR-ACNC: NEGATIVE — SIGNIFICANT CHANGE UP
LIDOCAIN IGE QN: 26 U/L — SIGNIFICANT CHANGE UP (ref 13–75)
MCHC RBC-ENTMCNC: 27.3 PG — SIGNIFICANT CHANGE UP (ref 27–34)
MCHC RBC-ENTMCNC: 31.5 GM/DL — LOW (ref 32–36)
MCV RBC AUTO: 86.5 FL — SIGNIFICANT CHANGE UP (ref 80–100)
NITRITE UR-MCNC: NEGATIVE — SIGNIFICANT CHANGE UP
NRBC # BLD: 0 /100 WBCS — SIGNIFICANT CHANGE UP (ref 0–0)
PH UR: 5 — SIGNIFICANT CHANGE UP (ref 5–8)
PLATELET # BLD AUTO: 274 K/UL — SIGNIFICANT CHANGE UP (ref 150–400)
POTASSIUM SERPL-MCNC: 3.7 MMOL/L — SIGNIFICANT CHANGE UP (ref 3.5–5.3)
POTASSIUM SERPL-SCNC: 3.7 MMOL/L — SIGNIFICANT CHANGE UP (ref 3.5–5.3)
PROT SERPL-MCNC: 7.5 G/DL — SIGNIFICANT CHANGE UP (ref 6–8.3)
PROT UR-MCNC: NEGATIVE MG/DL — SIGNIFICANT CHANGE UP
PROTHROM AB SERPL-ACNC: 14 SEC — HIGH (ref 9.5–13)
RAPID RVP RESULT: SIGNIFICANT CHANGE UP
RBC # BLD: 4.66 M/UL — SIGNIFICANT CHANGE UP (ref 3.8–5.2)
RBC # FLD: 14.5 % — SIGNIFICANT CHANGE UP (ref 10.3–14.5)
SARS-COV-2 RNA SPEC QL NAA+PROBE: SIGNIFICANT CHANGE UP
SODIUM SERPL-SCNC: 138 MMOL/L — SIGNIFICANT CHANGE UP (ref 135–145)
SP GR SPEC: 1.03 — SIGNIFICANT CHANGE UP (ref 1–1.03)
UROBILINOGEN FLD QL: 0.2 MG/DL — SIGNIFICANT CHANGE UP (ref 0.2–1)
WBC # BLD: 10.52 K/UL — HIGH (ref 3.8–10.5)
WBC # FLD AUTO: 10.52 K/UL — HIGH (ref 3.8–10.5)

## 2024-09-22 PROCEDURE — 99285 EMERGENCY DEPT VISIT HI MDM: CPT

## 2024-09-22 PROCEDURE — 76705 ECHO EXAM OF ABDOMEN: CPT | Mod: 26

## 2024-09-22 PROCEDURE — 74177 CT ABD & PELVIS W/CONTRAST: CPT | Mod: 26,MC

## 2024-09-22 PROCEDURE — 71045 X-RAY EXAM CHEST 1 VIEW: CPT | Mod: 26

## 2024-09-22 PROCEDURE — 99223 1ST HOSP IP/OBS HIGH 75: CPT

## 2024-09-22 PROCEDURE — 93010 ELECTROCARDIOGRAM REPORT: CPT

## 2024-09-22 RX ORDER — INFLUENZA VIRUS VACCINE 15; 15; 15; 15 UG/.5ML; UG/.5ML; UG/.5ML; UG/.5ML
0.5 SUSPENSION INTRAMUSCULAR ONCE
Refills: 0 | Status: DISCONTINUED | OUTPATIENT
Start: 2024-09-22 | End: 2024-09-24

## 2024-09-22 RX ORDER — ALCOHOL ANTISEPTIC PADS
25 PADS, MEDICATED (EA) TOPICAL ONCE
Refills: 0 | Status: DISCONTINUED | OUTPATIENT
Start: 2024-09-22 | End: 2024-09-24

## 2024-09-22 RX ORDER — SODIUM CHLORIDE IRRIG SOLUTION 0.9 %
1000 SOLUTION, IRRIGATION IRRIGATION
Refills: 0 | Status: DISCONTINUED | OUTPATIENT
Start: 2024-09-22 | End: 2024-09-24

## 2024-09-22 RX ORDER — ROSUVASTATIN CALCIUM 20 MG/1
10 TABLET, COATED ORAL AT BEDTIME
Refills: 0 | Status: DISCONTINUED | OUTPATIENT
Start: 2024-09-22 | End: 2024-09-24

## 2024-09-22 RX ORDER — SODIUM CHLORIDE 0.9 % (FLUSH) 0.9 %
1000 SYRINGE (ML) INJECTION ONCE
Refills: 0 | Status: COMPLETED | OUTPATIENT
Start: 2024-09-22 | End: 2024-09-22

## 2024-09-22 RX ORDER — METHYLPREDNISOLONE ACETATE 80 MG/ML
125 INJECTION, SUSPENSION INTRA-ARTICULAR; INTRALESIONAL; INTRAMUSCULAR; SOFT TISSUE ONCE
Refills: 0 | Status: COMPLETED | OUTPATIENT
Start: 2024-09-22 | End: 2024-09-22

## 2024-09-22 RX ORDER — GLUCAGON INJECTION, SOLUTION 0.5 MG/.1ML
1 INJECTION, SOLUTION SUBCUTANEOUS ONCE
Refills: 0 | Status: DISCONTINUED | OUTPATIENT
Start: 2024-09-22 | End: 2024-09-24

## 2024-09-22 RX ORDER — INSULIN LISPRO 100/ML
VIAL (ML) SUBCUTANEOUS
Refills: 0 | Status: DISCONTINUED | OUTPATIENT
Start: 2024-09-22 | End: 2024-09-24

## 2024-09-22 RX ORDER — INSULIN LISPRO 100/ML
VIAL (ML) SUBCUTANEOUS AT BEDTIME
Refills: 0 | Status: DISCONTINUED | OUTPATIENT
Start: 2024-09-22 | End: 2024-09-24

## 2024-09-22 RX ORDER — ROSUVASTATIN CALCIUM 20 MG/1
1 TABLET, COATED ORAL
Refills: 0 | DISCHARGE

## 2024-09-22 RX ORDER — SEMAGLUTIDE 1.34 MG/ML
0.5 INJECTION, SOLUTION SUBCUTANEOUS
Refills: 0 | DISCHARGE

## 2024-09-22 RX ORDER — ALCOHOL ANTISEPTIC PADS
15 PADS, MEDICATED (EA) TOPICAL ONCE
Refills: 0 | Status: DISCONTINUED | OUTPATIENT
Start: 2024-09-22 | End: 2024-09-24

## 2024-09-22 RX ORDER — ACETAMINOPHEN 325 MG
1000 TABLET ORAL ONCE
Refills: 0 | Status: COMPLETED | OUTPATIENT
Start: 2024-09-22 | End: 2024-09-22

## 2024-09-22 RX ORDER — OXYCODONE AND ACETAMINOPHEN 5; 325 MG/1; MG/1
1 TABLET ORAL EVERY 4 HOURS
Refills: 0 | Status: DISCONTINUED | OUTPATIENT
Start: 2024-09-22 | End: 2024-09-22

## 2024-09-22 RX ORDER — ACETAMINOPHEN 325 MG
650 TABLET ORAL EVERY 6 HOURS
Refills: 0 | Status: DISCONTINUED | OUTPATIENT
Start: 2024-09-22 | End: 2024-09-24

## 2024-09-22 RX ORDER — AMPICILLIN, SULBACTAM 250; 125 MG/ML; MG/ML
3 INJECTION, POWDER, FOR SOLUTION INTRAMUSCULAR; INTRAVENOUS EVERY 6 HOURS
Refills: 0 | Status: DISCONTINUED | OUTPATIENT
Start: 2024-09-22 | End: 2024-09-22

## 2024-09-22 RX ORDER — EMPAGLIFLOZIN 25 MG/1
1 TABLET, FILM COATED ORAL
Refills: 0 | DISCHARGE

## 2024-09-22 RX ORDER — ONDANSETRON HCL/PF 4 MG/2 ML
4 VIAL (ML) INJECTION ONCE
Refills: 0 | Status: COMPLETED | OUTPATIENT
Start: 2024-09-22 | End: 2024-09-22

## 2024-09-22 RX ORDER — PIPERACILLIN SODIUM AND TAZOBACTAM SODIUM 12; 1.5 G/60ML; G/60ML
3.38 INJECTION, POWDER, LYOPHILIZED, FOR SOLUTION INTRAVENOUS ONCE
Refills: 0 | Status: COMPLETED | OUTPATIENT
Start: 2024-09-22 | End: 2024-09-22

## 2024-09-22 RX ORDER — DIPHENHYDRAMINE HCL 12.5MG/5ML
50 LIQUID (ML) ORAL ONCE
Refills: 0 | Status: COMPLETED | OUTPATIENT
Start: 2024-09-22 | End: 2024-09-22

## 2024-09-22 RX ORDER — ALCOHOL ANTISEPTIC PADS
12.5 PADS, MEDICATED (EA) TOPICAL ONCE
Refills: 0 | Status: DISCONTINUED | OUTPATIENT
Start: 2024-09-22 | End: 2024-09-24

## 2024-09-22 RX ADMIN — METHYLPREDNISOLONE ACETATE 125 MILLIGRAM(S): 80 INJECTION, SUSPENSION INTRA-ARTICULAR; INTRALESIONAL; INTRAMUSCULAR; SOFT TISSUE at 10:44

## 2024-09-22 RX ADMIN — Medication 1000 MILLILITER(S): at 05:24

## 2024-09-22 RX ADMIN — Medication 1000 MILLILITER(S): at 09:11

## 2024-09-22 RX ADMIN — Medication 1000 MILLILITER(S): at 07:11

## 2024-09-22 RX ADMIN — Medication 5000 UNIT(S): at 14:31

## 2024-09-22 RX ADMIN — Medication 1000 MILLILITER(S): at 04:24

## 2024-09-22 RX ADMIN — PIPERACILLIN SODIUM AND TAZOBACTAM SODIUM 3.38 GRAM(S): 12; 1.5 INJECTION, POWDER, LYOPHILIZED, FOR SOLUTION INTRAVENOUS at 09:11

## 2024-09-22 RX ADMIN — Medication 400 MILLIGRAM(S): at 04:24

## 2024-09-22 RX ADMIN — Medication 1: at 17:03

## 2024-09-22 RX ADMIN — Medication 1000 MILLIGRAM(S): at 10:04

## 2024-09-22 RX ADMIN — Medication 4 MILLIGRAM(S): at 09:20

## 2024-09-22 RX ADMIN — PIPERACILLIN SODIUM AND TAZOBACTAM SODIUM 200 GRAM(S): 12; 1.5 INJECTION, POWDER, LYOPHILIZED, FOR SOLUTION INTRAVENOUS at 07:11

## 2024-09-22 RX ADMIN — Medication 1000 MILLIGRAM(S): at 04:39

## 2024-09-22 RX ADMIN — ROSUVASTATIN CALCIUM 10 MILLIGRAM(S): 20 TABLET, COATED ORAL at 22:12

## 2024-09-22 RX ADMIN — Medication 50 MILLIGRAM(S): at 10:44

## 2024-09-22 RX ADMIN — Medication 400 MILLIGRAM(S): at 09:22

## 2024-09-22 RX ADMIN — Medication 5000 UNIT(S): at 22:12

## 2024-09-22 NOTE — H&P ADULT - NSHPPHYSICALEXAM_GEN_ALL_CORE
T(C): 37.7 (09-22-24 @ 10:04), Max: 38.8 (09-22-24 @ 03:43)  HR: 82 (09-22-24 @ 10:04) (82 - 96)  BP: 107/60 (09-22-24 @ 10:04) (95/60 - 117/63)  RR: 16 (09-22-24 @ 10:04) (16 - 18)  SpO2: 97% (09-22-24 @ 10:04) (95% - 99%)  Wt(kg): --    Physical Exam:   GENERAL: well-groomed, well-developed, NAD  HEENT: head NC/AT; EOM intact, PERRLA, conjunctiva & sclera clear; hearing grossly intact, moist mucous membranes  NECK: supple, no JVD  RESPIRATORY: CTA B/L, no wheezing, rales, rhonchi or rubs  CARDIOVASCULAR: S1&S2, RRR, no murmurs or gallops  ABDOMEN: soft, non-tender, non-distended, + Bowel sounds x4 quadrants, no guarding, rebound or rigidity  MUSCULOSKELETAL:  no clubbing, cyanosis or edema of all 4 extremities  LYMPH: no cervical lymphadenopathy  VASCULAR: Radial pulses 2+ bilaterally, no varicose veins   SKIN: warm and dry, color normal  NEUROLOGIC: AA&O X3, MAEx4, negative kernig and brudzinski sin T(C): 37.7 (09-22-24 @ 10:04), Max: 38.8 (09-22-24 @ 03:43)  HR: 82 (09-22-24 @ 10:04) (82 - 96)  BP: 107/60 (09-22-24 @ 10:04) (95/60 - 117/63)  RR: 16 (09-22-24 @ 10:04) (16 - 18)  SpO2: 97% (09-22-24 @ 10:04) (95% - 99%)  Wt(kg): --    Physical Exam:   GENERAL: well-groomed, well-developed, NAD  HEENT: head NC/AT; EOM intact, PERRLA, conjunctiva & sclera clear; hearing grossly intact, moist mucous membranes  NECK: supple, no JVD  RESPIRATORY: CTA B/L, no wheezing, rales, rhonchi or rubs  CARDIOVASCULAR: S1&S2, RRR, no murmurs or gallops  ABDOMEN: soft, non-tender, non-distended, + Bowel sounds x4 quadrants, no guarding, rebound or rigidity  MUSCULOSKELETAL:  no clubbing, cyanosis or edema of all 4 extremities  LYMPH: no cervical lymphadenopathy  VASCULAR: Radial pulses 2+ bilaterally, no varicose veins   SKIN: warm and dry, color normal. NO erythema noted of RLE where she had varicose vein procedure  NEUROLOGIC: AA&O X3, MAEx4, negative kernig and brudzinski sin

## 2024-09-22 NOTE — ED ADULT NURSE NOTE - NSFALLRISKASMTTYPE_ED_ALL_ED
PT had knee replacement one year ago. PT has been seeing dermatologist for a rash right next to scar incision. PT wants to know does he need to be antibiotics for the biopsy scheduled and PT says the Dermatologist also talked about giving him a steroid injection where the flair up is located    Initial (On Arrival)

## 2024-09-22 NOTE — CONSULT NOTE ADULT - SUBJECTIVE AND OBJECTIVE BOX
HPI:  MS Traore is a 48 yo F PMH DM2, HLD, varicose veins who presented to the hospital with c/o fevers chills. Had sore throat and cough. She feels weak all over and also states she has myalgias. Had  headache but resolved. She had a RLE ( thigh ) varicose vein ablation about 4 weeks ago, she had some erythema and pain c/w cellulitis and was prescribed a course of levaquin In ED Tmax 101.8 wbc 10K. CXR clear CT AP no acute pathology. UA neg. Nontoxic looking.    Infectious Disease consult was called to evaluate pt.      Past Medical & Surgical Hx:  PAST MEDICAL & SURGICAL HISTORY:  Fibroids    No significant past surgical history        Social History--  EtOH: denies   Tobacco: denies   Drug Use: denies       FAMILY HISTORY:  No pertinent family history in first degree relatives      Allergies  Zosyn (Rash)    Intolerances  NONE      Home Medications:  Jardiance 10 mg oral tablet: 1 tab(s) orally once a day (22 Sep 2024 15:18)  Ozempic 2 mg/1.5 mL (0.25 mg or 0.5 mg dose) subcutaneous solution: 0.5 milligram(s) subcutaneously once a week (22 Sep 2024 15:19)  rosuvastatin 10 mg oral capsule: 1 cap(s) orally once a day (22 Sep 2024 15:18)      Current Inpatient Medications :    ANTIBIOTICS:       OTHER RELEVANT MEDICATIONS :  acetaminophen     Tablet .. 650 milliGRAM(s) Oral every 6 hours PRN  dextrose 5%. 1000 milliLiter(s) IV Continuous <Continuous>  dextrose 5%. 1000 milliLiter(s) IV Continuous <Continuous>  dextrose 50% Injectable 25 Gram(s) IV Push once  dextrose 50% Injectable 25 Gram(s) IV Push once  dextrose 50% Injectable 12.5 Gram(s) IV Push once  dextrose Oral Gel 15 Gram(s) Oral once PRN  glucagon  Injectable 1 milliGRAM(s) IntraMuscular once  heparin   Injectable 5000 Unit(s) SubCutaneous every 8 hours  influenza   Vaccine 0.5 milliLiter(s) IntraMuscular once  insulin lispro (ADMELOG) corrective regimen sliding scale   SubCutaneous at bedtime  insulin lispro (ADMELOG) corrective regimen sliding scale   SubCutaneous three times a day before meals  rosuvastatin 10 milliGRAM(s) Oral at bedtime      ROS:  CONSTITUTIONAL: + fever or chills  EYES:  Negative  blurry vision or double vision  CARDIOVASCULAR:  Negative for chest pain or palpitations  RESPIRATORY:  Negative for cough, wheezing, or SOB   GASTROINTESTINAL:  Negative for nausea, vomiting, diarrhea, constipation, or abdominal pain  GENITOURINARY:  Negative frequency, urgency , dysuria or hematuria   NEUROLOGIC:  No headache, confusion, dizziness, lightheadedness  All other systems were reviewed and are negative      Physical Exam:  Vital Signs Last 24 Hrs  T(C): 36.7 (22 Sep 2024 18:32), Max: 38.8 (22 Sep 2024 03:43)  T(F): 98.1 (22 Sep 2024 18:32), Max: 101.8 (22 Sep 2024 03:43)  HR: 72 (22 Sep 2024 18:32) (61 - 96)  BP: 110/67 (22 Sep 2024 18:32) (95/60 - 117/63)  BP(mean): --  RR: 19 (22 Sep 2024 18:32) (16 - 19)  SpO2: 94% (22 Sep 2024 18:32) (94% - 99%)    Parameters below as of 22 Sep 2024 18:32  Patient On (Oxygen Delivery Method): room air      Height (cm): 165.1 (09-22 @ 03:43)  Weight (kg): 93.4 (09-22 @ 03:43)  BMI (kg/m2): 34.3 (09-22 @ 03:43)  BSA (m2): 2 (09-22 @ 03:43)    General: no acute distress  Neck: supple, trachea midline  Lungs: clear, no wheeze/rhonchi  Cardiovascular: regular rate and rhythm, S1 S2  Abdomen: soft, nontender, ND, bowel sounds normal  Neurological:  alert and oriented x3  Skin: no rash  Extremities: no edema    Labs:                         12.7   10.52 )-----------( 274      ( 22 Sep 2024 04:35 )             40.3   09-22    138  |  106  |  9   ----------------------------<  184[H]  3.7   |  24  |  0.68    Ca    8.6      22 Sep 2024 04:35    TPro  7.5  /  Alb  3.2[L]  /  TBili  0.6  /  DBili  x   /  AST  17  /  ALT  28  /  AlkPhos  87  09-22    Urinalysis with Rflx Culture (09.22.24 @ 06:55)    Urine Appearance: Clear   Color: Yellow   Specific Gravity: 1.030   pH Urine: 5.0   Protein, Urine: Negative mg/dL   Glucose Qualitative, Urine: >=1000 mg/dL   Ketone - Urine: 15 mg/dL   Blood, Urine: Negative   Bilirubin: Negative   Urobilinogen: 0.2 mg/dL   Leukocyte Esterase Concentration: Negative   Nitrite: Negative      RECENT CULTURES:          RADIOLOGY & ADDITIONAL STUDIES:    ACC: [id number] EXAM:  XR CHEST PORTABLE IMMED 1V   ORDERED BY: MICKEY ELDER     PROCEDURE DATE:  09/22/2024          INTERPRETATION:  CLINICAL STATEMENT: Cold, fever, vomiting    TECHNIQUE: AP view of the chest.      COMPARISON: None    The cardiomediastinal silhouette is normal and the bhavin are not enlarged.   The trachea is midline. Suboptimal degree of inspiration. There is no   focal lung consolidation or sizable pleural effusion. No significant   osseous abnormality. Spine fusion hardware noted.    IMPRESSION:    Unremarkable frontal chest x ray      ACC: [id number] EXAM:  CT ABDOMEN AND PELVIS IC   ORDERED BY: MICKEY ELDER     PROCEDURE DATE:  09/22/2024          INTERPRETATION:  CLINICAL INFORMATION: Abdominal pain, fever, and chills.   Right upper quadrant pain per EMR.    COMPARISON: Pelvic ultrasound 11/30/2015    CONTRAST/COMPLICATIONS:  IV Contrast: Omnipaque 350  90 cc administered   10 cc discarded  Oral Contrast: NONE  Complications: None reported at time of study completion    PROCEDURE:  CT of the Abdomen and Pelvis was performed.  Sagittal and coronal reformats were performed.    FINDINGS:    LOWER CHEST: No visualized pleural effusion.    LIVER: Enlarged, 20.7 cm in craniocaudal dimension. Main portal vein and   hepatic veins are patent. Hepatic steatosis.  BILE DUCTS: No distention  GALLBLADDER: Unremarkable CT appearance of the gallbladder. Correlate   with pending ultrasound.  SPLEEN: Spleen size within normal limits  PANCREAS: No acute peripancreatic inflammation  ADRENALS: Left adrenal 6.3 cm myelolipoma. Unremarkable right adrenal.  KIDNEYS/URETERS: No hydronephrosis or obstructing ureteral calculus.    BLADDER: Minimally distended.  REPRODUCTIVE ORGANS: Hysterectomy. Right adnexal 2 cm cyst versus   follicle.    BOWEL: Stomach is underdistended. No small bowel distention. Few   fecalized nondistended small bowel loops are identified. Appendix is not   obstructed. Mild stool burden of the colon and overall under distention   limits evaluation of the colonic mucosa.  PERITONEUM/RETROPERITONEUM: No ascites  VESSELS: No abdominal aortic aneurysm  LYMPH NODES: Small volume nodes not enlarged by CT size criteria.  ABDOMINAL WALL: Anterior abdominal wall laxity/rectus diastases.  BONES: Multilevel spinal degenerative changes.    IMPRESSION:    Etiology of abdominal symptoms is not clearly elucidated.    Enlarged liver with steatosis. Unremarkable CT appearance of the   gallbladder. Correlate with LFTs and pending ultrasound.    Hysterectomy. Right adnexal 2 cm cyst versus follicle.    Left adrenal 6.3 cm myelolipoma.    Assessment :   MS Traore is a 48 yo F PMH DM2, HLD, varicose veins who presented to the hospital with c/o fevers chills In ED Tmax 101.8 wbc 10K. CXR clear CT AP no acute pathology. UA neg. Nontoxic looking.  rule out bacteremia  Drug fever?  Viral?      Plan :   Hold antibiotic for now  Fu cultures  Trend temps and cbc  CT chest  ESR CRP JESSICA      Continue with present regiment .  Approptiate use of antibiotics and adverse effects reviewed.      I have discussed the above plan of care with patient/son in detail. They expressed understanding of the treatment plan . Risks, benefits and alternatives discussed in detail. I have asked if they have any questions or concerns and appropriately addressed them to the best of my ability .      > 45 minutes spent in direct patient care reviewing  the notes, lab data/ imaging , discussion with multidisciplinary team. All questions were addressed and answered to the best of my capacity .    Thank you for allowing me to participate in the care of your patient .      Tomas Khan MD  Infectious Disease  [phone]     HPI:  MS Traore is a 48 yo F PMH DM2, HLD, varicose veins who presented to the hospital with c/o fevers chills. Had sore throat and cough. She feels weak all over and also states she has myalgias. Had  headache but resolved. She had a RLE ( thigh ) varicose vein ablation about 4 weeks ago, she had some erythema and pain c/w cellulitis and was prescribed a course of levaquin In ED Tmax 101.8 wbc 10K. CXR clear CT AP no acute pathology. UA neg. Nontoxic looking.    Infectious Disease consult was called to evaluate pt.      Past Medical & Surgical Hx:  PAST MEDICAL & SURGICAL HISTORY:  Fibroids    No significant past surgical history        Social History--  EtOH: denies   Tobacco: denies   Drug Use: denies       FAMILY HISTORY:  No pertinent family history in first degree relatives      Allergies  Zosyn (Rash)    Intolerances  NONE      Home Medications:  Jardiance 10 mg oral tablet: 1 tab(s) orally once a day (22 Sep 2024 15:18)  Ozempic 2 mg/1.5 mL (0.25 mg or 0.5 mg dose) subcutaneous solution: 0.5 milligram(s) subcutaneously once a week (22 Sep 2024 15:19)  rosuvastatin 10 mg oral capsule: 1 cap(s) orally once a day (22 Sep 2024 15:18)      Current Inpatient Medications :    ANTIBIOTICS:       OTHER RELEVANT MEDICATIONS :  acetaminophen     Tablet .. 650 milliGRAM(s) Oral every 6 hours PRN  dextrose 5%. 1000 milliLiter(s) IV Continuous <Continuous>  dextrose 5%. 1000 milliLiter(s) IV Continuous <Continuous>  dextrose 50% Injectable 25 Gram(s) IV Push once  dextrose 50% Injectable 25 Gram(s) IV Push once  dextrose 50% Injectable 12.5 Gram(s) IV Push once  dextrose Oral Gel 15 Gram(s) Oral once PRN  glucagon  Injectable 1 milliGRAM(s) IntraMuscular once  heparin   Injectable 5000 Unit(s) SubCutaneous every 8 hours  influenza   Vaccine 0.5 milliLiter(s) IntraMuscular once  insulin lispro (ADMELOG) corrective regimen sliding scale   SubCutaneous at bedtime  insulin lispro (ADMELOG) corrective regimen sliding scale   SubCutaneous three times a day before meals  rosuvastatin 10 milliGRAM(s) Oral at bedtime      ROS:  CONSTITUTIONAL: + fever or chills  EYES:  Negative  blurry vision or double vision  CARDIOVASCULAR:  Negative for chest pain or palpitations  RESPIRATORY:  Negative for cough, wheezing, or SOB   GASTROINTESTINAL:  Negative for nausea, vomiting, diarrhea, constipation, or abdominal pain  GENITOURINARY:  Negative frequency, urgency , dysuria or hematuria   NEUROLOGIC:  No headache, confusion, dizziness, lightheadedness  All other systems were reviewed and are negative      Physical Exam:  Vital Signs Last 24 Hrs  T(C): 36.7 (22 Sep 2024 18:32), Max: 38.8 (22 Sep 2024 03:43)  T(F): 98.1 (22 Sep 2024 18:32), Max: 101.8 (22 Sep 2024 03:43)  HR: 72 (22 Sep 2024 18:32) (61 - 96)  BP: 110/67 (22 Sep 2024 18:32) (95/60 - 117/63)  BP(mean): --  RR: 19 (22 Sep 2024 18:32) (16 - 19)  SpO2: 94% (22 Sep 2024 18:32) (94% - 99%)    Parameters below as of 22 Sep 2024 18:32  Patient On (Oxygen Delivery Method): room air      Height (cm): 165.1 (09-22 @ 03:43)  Weight (kg): 93.4 (09-22 @ 03:43)  BMI (kg/m2): 34.3 (09-22 @ 03:43)  BSA (m2): 2 (09-22 @ 03:43)    General: no acute distress  Neck: supple, trachea midline  Lungs: clear, no wheeze/rhonchi  Cardiovascular: regular rate and rhythm, S1 S2  Abdomen: soft, nontender, ND, bowel sounds normal  Neurological:  alert and oriented x3  Skin: no rash  Extremities: no edema    Labs:                         12.7   10.52 )-----------( 274      ( 22 Sep 2024 04:35 )             40.3   09-22    138  |  106  |  9   ----------------------------<  184[H]  3.7   |  24  |  0.68    Ca    8.6      22 Sep 2024 04:35    TPro  7.5  /  Alb  3.2[L]  /  TBili  0.6  /  DBili  x   /  AST  17  /  ALT  28  /  AlkPhos  87  09-22    Urinalysis with Rflx Culture (09.22.24 @ 06:55)    Urine Appearance: Clear   Color: Yellow   Specific Gravity: 1.030   pH Urine: 5.0   Protein, Urine: Negative mg/dL   Glucose Qualitative, Urine: >=1000 mg/dL   Ketone - Urine: 15 mg/dL   Blood, Urine: Negative   Bilirubin: Negative   Urobilinogen: 0.2 mg/dL   Leukocyte Esterase Concentration: Negative   Nitrite: Negative      RECENT CULTURES:          RADIOLOGY & ADDITIONAL STUDIES:    ACC: [id number] EXAM:  XR CHEST PORTABLE IMMED 1V   ORDERED BY: MICKEY ELDER     PROCEDURE DATE:  09/22/2024          INTERPRETATION:  CLINICAL STATEMENT: Cold, fever, vomiting    TECHNIQUE: AP view of the chest.      COMPARISON: None    The cardiomediastinal silhouette is normal and the bhavin are not enlarged.   The trachea is midline. Suboptimal degree of inspiration. There is no   focal lung consolidation or sizable pleural effusion. No significant   osseous abnormality. Spine fusion hardware noted.    IMPRESSION:    Unremarkable frontal chest x ray      ACC: [id number] EXAM:  CT ABDOMEN AND PELVIS IC   ORDERED BY: MICKEY ELDER     PROCEDURE DATE:  09/22/2024          INTERPRETATION:  CLINICAL INFORMATION: Abdominal pain, fever, and chills.   Right upper quadrant pain per EMR.    COMPARISON: Pelvic ultrasound 11/30/2015    CONTRAST/COMPLICATIONS:  IV Contrast: Omnipaque 350  90 cc administered   10 cc discarded  Oral Contrast: NONE  Complications: None reported at time of study completion    PROCEDURE:  CT of the Abdomen and Pelvis was performed.  Sagittal and coronal reformats were performed.    FINDINGS:    LOWER CHEST: No visualized pleural effusion.    LIVER: Enlarged, 20.7 cm in craniocaudal dimension. Main portal vein and   hepatic veins are patent. Hepatic steatosis.  BILE DUCTS: No distention  GALLBLADDER: Unremarkable CT appearance of the gallbladder. Correlate   with pending ultrasound.  SPLEEN: Spleen size within normal limits  PANCREAS: No acute peripancreatic inflammation  ADRENALS: Left adrenal 6.3 cm myelolipoma. Unremarkable right adrenal.  KIDNEYS/URETERS: No hydronephrosis or obstructing ureteral calculus.    BLADDER: Minimally distended.  REPRODUCTIVE ORGANS: Hysterectomy. Right adnexal 2 cm cyst versus   follicle.    BOWEL: Stomach is underdistended. No small bowel distention. Few   fecalized nondistended small bowel loops are identified. Appendix is not   obstructed. Mild stool burden of the colon and overall under distention   limits evaluation of the colonic mucosa.  PERITONEUM/RETROPERITONEUM: No ascites  VESSELS: No abdominal aortic aneurysm  LYMPH NODES: Small volume nodes not enlarged by CT size criteria.  ABDOMINAL WALL: Anterior abdominal wall laxity/rectus diastases.  BONES: Multilevel spinal degenerative changes.    IMPRESSION:    Etiology of abdominal symptoms is not clearly elucidated.    Enlarged liver with steatosis. Unremarkable CT appearance of the   gallbladder. Correlate with LFTs and pending ultrasound.    Hysterectomy. Right adnexal 2 cm cyst versus follicle.    Left adrenal 6.3 cm myelolipoma.    Assessment :   MS Traore is a 48 yo F PMH DM2, HLD, varicose veins who presented to the hospital with c/o fevers chills In ED Tmax 101.8 wbc 10K. CXR clear CT AP no acute pathology. UA neg. Nontoxic looking. NO recent dental work No recent travel or sick contacts.  Rule out bacteremia  Drug fever?  Viral?      Plan :   Hold antibiotic for now  Fu cultures  Trend temps and cbc  CT chest  ESR CRP JESSICA procalcitonin      Continue with present regiment .  Approptiate use of antibiotics and adverse effects reviewed.      I have discussed the above plan of care with patient/son in detail. They expressed understanding of the treatment plan . Risks, benefits and alternatives discussed in detail. I have asked if they have any questions or concerns and appropriately addressed them to the best of my ability .      > 45 minutes spent in direct patient care reviewing  the notes, lab data/ imaging , discussion with multidisciplinary team. All questions were addressed and answered to the best of my capacity .    Thank you for allowing me to participate in the care of your patient .      Tomas Khan MD  Infectious Disease  [phone]

## 2024-09-22 NOTE — ED PROVIDER NOTE - OBJECTIVE STATEMENT
Patient complaining of fever on and off Temp in .8 denies taking any pain medication with nauseaous  fever 50 y/o female H/O DM2 C/C Fever 101.8, chills,  body aches, headache , sore throat and slight cough x 2 days , During the ED evaluation she was found to have RUQ pain.   no chest pain, no SOB, no palpitations, no vomiting, no diarrhea , no urinary symptoms, no GI bleeding. no neuro changes.  Meds Ozempic, Jardiance

## 2024-09-22 NOTE — ED ADULT NURSE NOTE - OBJECTIVE STATEMENT
Pt is aaox4 and follows command. Pt c/o generalized body pain and fever. Pt denies sick contact / recent travels. Temp in triage is 101.8. IV line placed, labs drawn and fluid & med administration started as ordered. Plan of care ongoing.

## 2024-09-22 NOTE — ED PROVIDER NOTE - SIGNIFICANT NEGATIVE FINDINGS
no chest pain, no SOB, no palpitations, no vomiting, no diarrhea , no urinary symptoms, no GI bleeding. no neuro changes.

## 2024-09-22 NOTE — PATIENT PROFILE ADULT - FALL HARM RISK - HARM RISK INTERVENTIONS

## 2024-09-22 NOTE — ED PROVIDER NOTE - PROGRESS NOTE DETAILS
I, Dr. Currie, received this patient in sign out at 0715 from Dr. Hyde pending CT Called to bedside by RN, patient having erythema to palms bilaterally as well as petechial rashes to bilateral forearms which are itchy.  Patient denies any allergies.  Patient has not had any new medications for quite some time.  Unclear source of possible allergic reaction.  Patient's airway is patent, there is no tongue swelling, lip swelling, difficulty breathing, wheezing, nausea, abdominal pain.  I do not suspect anaphylaxis.  Will give Benadryl and Solu-Medrol.      Patient require admission for fever of unclear source, discussed with hospitalist will admit patient.

## 2024-09-22 NOTE — ED ADULT NURSE REASSESSMENT NOTE - NS ED NURSE REASSESS COMMENT FT1
c/o redness and itching of her karina and some itching in her body, no sob, DR. Leonardo made aware with order made and carreid out

## 2024-09-22 NOTE — ED ADULT NURSE NOTE - NSFALLUNIVINTERV_ED_ALL_ED
Bed/Stretcher in lowest position, wheels locked, appropriate side rails in place/Call bell, personal items and telephone in reach/Instruct patient to call for assistance before getting out of bed/chair/stretcher/Non-slip footwear applied when patient is off stretcher/[place] to call system/Physically safe environment - no spills, clutter or unnecessary equipment/Purposeful proactive rounding/Room/bathroom lighting operational, light cord in reach

## 2024-09-22 NOTE — ED PROVIDER NOTE - CPE EDP SKIN NORM
Pt offered x-rays, but no clinical indication at this time.  Rx Anaprox DS and Robaxin with f/u  as outpt
normal...

## 2024-09-22 NOTE — H&P ADULT - ASSESSMENT
MS Traore is a 50 yo F presenting to the ER with fevers. PMH DM2.    Fever: etiology unclear  -CT scan and RUQ U/S reviewed  -RVP negative  -no clear source of infection.   -ID consulted    DM2: correctional insulin as ordered    Hepatic steatosis: outpatient follow up with PCP    Imaging findings:  Right adnexal 2 cm cyst versus follicle.    Left adrenal 6.3 cm myelolipoma.  Patient informed of above. Instructed to f/u with PCP within 4 weeks to discuss need for further surveillance and work up. She is agreeable and understands the importance of such.     DVT ppx: heparin MS Traore is a 50 yo F presenting to the ER with fevers. PMH DM2.    Fever: etiology unclear  -CT scan and RUQ U/S reviewed  -RVP negative  -no clear source of infection.   -monitor for further fever, trend WBC count  -ID consulted- Dr Khan    DM2: correctional insulin as ordered  -hold ozempic and jardiance    HLD: continue rosuvastatin  Hepatic steatosis: outpatient follow up with PCP    Imaging findings:  Right adnexal 2 cm cyst versus follicle.    Left adrenal 6.3 cm myelolipoma.  Patient informed of above. Instructed to f/u with PCP within 4 weeks to discuss need for further surveillance and work up. She is agreeable and understands the importance of such.     DVT ppx: heparin

## 2024-09-22 NOTE — ED PROVIDER NOTE - ASSOCIATED SYMPTOMS
Fever 101.8, chills,  body aches, headache , sore throat and slight cough x 2 days , During the ED evaluation she was found to have RUQ pain.

## 2024-09-22 NOTE — CHART NOTE - NSCHARTNOTEFT_GEN_A_CORE
Spoke with Dr Horowitz, Brief Op Note entered in error by surgeon. Asked permission to delete note.     Note deleted. Patient did not have any procedures today.

## 2024-09-22 NOTE — ED PROVIDER NOTE - CLINICAL SUMMARY MEDICAL DECISION MAKING FREE TEXT BOX
48 y/o female H/O DM2 C/C Fever 101.8, chills,  body aches, headache , sore throat and slight cough x 2 days , During the ED evaluation she was found to have RUQ pain.   no chest pain, no SOB, no palpitations, no vomiting, no diarrhea , no urinary symptoms, no GI bleeding. no neuro changes.  Meds Ozempic, Jardiance...  VSS:  Exam RUQ pain,  RVP was negative,  cultures, lactate US GB and CT abdominal, pelvis ordered and pending  IVF fluid bolus , IV Zosyn

## 2024-09-22 NOTE — H&P ADULT - HISTORY OF PRESENT ILLNESS
MS Traore is a 50 yo F presenting to the ER with fevers. PMH DM2.   She has been having fevers for the past 2 days and also sore throat and cough. She feels weak all over and also states she has myalgias.Had a headache earlier now resolved, no changes in vision, no neck pain or stiffness. She does have some pain in RUQ worse with palpation of that area, pain is not associated with food intake.   Denies nausea, vomiting, chest pain, SOB, palpitations,  constipation, diarrhea, melena, hematochezia, dysuria. denies any wounds and recent travel.      MS Traore is a 50 yo F presenting to the ER with fevers. PMH DM2, HLD, varicose veins.   She has been having fevers for the past 2 days and also sore throat and cough. She feels weak all over and also states she has myalgias. Had a headache earlier now resolved, no changes in vision, no neck pain or stiffness. She does have some pain in RUQ worse with palpation of that area, pain is not associated with food intake.   Denies nausea, vomiting, chest pain, SOB, palpitations,  constipation, diarrhea, melena, hematochezia, dysuria. denies any wounds and recent travel. Of note she had a RLE varicose vein ablation about 4 weeks ago, she had some erythema and pain and was prescribed a course of levaquin last week for cellulitis of which she has 2 days remaining. She states she was being treated for a cellulitis of RLE

## 2024-09-22 NOTE — ED PROVIDER NOTE - CARE PLAN
1 Principal Discharge DX:	Fever  Secondary Diagnosis:	Abdominal pain   Principal Discharge DX:	Systemic inflammatory response syndrome (SIRS)  Secondary Diagnosis:	Abdominal pain  Secondary Diagnosis:	Adrenal myelolipoma  Secondary Diagnosis:	Fatty liver  Secondary Diagnosis:	Enlarged liver

## 2024-09-22 NOTE — ED ADULT TRIAGE NOTE - HEIGHT IN FEET
[facility]      CARDIOLOGY PROGRESS NOTE  RECS:  1. Hx of CAD with  post percutaneous transluminal coronary angioplasty/stent to mid right coronary artery using a drug-eluting stent done in 2016. Stable no chest pain or chest pressure. Follow serial enzymes EKG today revealed NSR  w/o acute ischemic changes. Ok to hold Plavix and asa. 2. Acute on chronic diastolic heart failure- mildly decompensated. c/o orthopnea and INIGUEZ. Did not take lasix in the last few days. mildly decompensated. Continue with lasix po. Monitor I&O. Last echo 2017 with Mildly LV dysfunction EF% 45-50%. Not on bb or ARB due to allergies  3. Hypertension with borderline BP- possible related to pain medications reduced Norvasc to 2.5 mg daily. Will monitor. 4. Diabetes- medications and w/u per medical team   5. Hyperlipidemia- unable to tolerated statins- discussed with patient about [id number] starting-medications was approved but approved. But she refused. 6. Left femur fracture- plans for surgery tomorrow per Dr. Jordyn Gonzalez  Preoperative cardiovascular examination-stable cardiac status. moderates risk from Cardiac stand point     ASSESSMENT:  Hospital Problems  Date Reviewed: 6/11/2018          Codes Class Noted POA    Preoperative cardiovascular examination ICD-10-CM: Z01.810  ICD-9-CM: V72.81  6/11/2018 Unknown        * (Principal)Periprosthetic left distal femur fracture ICD-10-CM: M97. Arturo Crutch  ICD-9-CM: 996.44, V43.65  6/10/2018 Yes        Callie-prosthetic femur fracture at tip of prosthesis ICD-10-CM: M97. Fidelia Musa [id number]  ICD-9-CM: 996.44, V43.64  6/10/2018 Unknown        Bilateral lower extremity edema ICD-10-CM: R60.0  ICD-9-CM: 782.3  4/25/2017 Yes        Uncomplicated asthma NNZ-92-LE: J45.909  ICD-9-CM: 493.90  4/8/2016 Yes        Morbid obesity ([address] ICD-10-CM: E66.01  ICD-9-CM: 278.01  Unknown Yes    Overview Signed 5/9/2013  8:54 AM by [employer]     Weight loss has been strongly encouraged by following dietary restrictions and an exercise routine. Coronary atherosclerosis of native coronary artery ICD-10-CM: I25.10  ICD-9-CM: 414.01  Unknown Yes        Chronic diastolic heart failure (HCC) ICD-10-CM: I50.32  ICD-9-CM: 428.32  Unknown Yes    Overview Signed 5/9/2013  8:58 AM by Megan Cooper     Stable, edema better, uses PRN Lasix             IDDM (insulin dependent diabetes mellitus) ([facility] ICD-10-CM: E11.9, Z79.4  ICD-9-CM: 250.00, V58.67  12/4/2012 Yes        Diabetic neuropathy ([facility] ICD-10-CM: E11.40  ICD-9-CM: 250.60, 357.2  4/20/2012 Yes        DJD (degenerative joint disease) ICD-10-CM: M19.90  ICD-9-CM: 715.90  Unknown Yes        S/P joint replacement ICD-10-CM: Z96.60  ICD-9-CM: V43.60  Unknown Yes    Overview Signed 3/4/2011  9:40 AM by Trinidad Hull     Status post left hip replacement (2006) and left knee replacement (2005)             Noncompliance with medication regimen ICD-10-CM: Z91.14  ICD-9-CM: V15.81  2/8/2011 Yes        Anxiety ICD-10-CM: F41.9  ICD-9-CM: 300.00  2/8/2011 Yes        Essential hypertension ICD-10-CM: I10  ICD-9-CM: 401.9  5/20/2010 Yes    Overview Signed 7/28/2016 10:34 AM by Jaya Chang MD     controlled             Hypovitaminosis D ICD-10-CM: E55.9  ICD-9-CM: 268.9  5/20/2010 Yes                SUBJECTIVE:  none    No CP or SOB    OBJECTIVE:    VS:   Visit Vitals    /70 (BP 1 Location: Left arm, BP Patient Position: At rest)    Pulse (!) 103    Temp 98.9 °F (37.2 °C)    Resp 20    Wt 116.6 kg (257 lb)    SpO2 95%    Breastfeeding No    BMI 40.25 kg/m2         Intake/Output Summary (Last 24 hours) at 06/12/18 0705  Last data filed at 06/12/18 2023   Gross per 24 hour   Intake              920 ml   Output              850 ml   Net               70 ml         General: alert and in no apparent distress  HENT: Normocephalic, atraumatic. Normal external eye.   Neck :  no JVD  Cardiac:  S1, S2 normal  Lungs: clear to auscultation bilaterally  Abdomen: Soft, nontender, no masses  Extremities:  No c/c/e,       Labs: Results:       Chemistry Recent Labs      06/11/18   0240  06/10/18   1132   GLU  127*  244*   NA  143  141   K  3.5  3.2*   CL  110*  108   CO2  27  26   BUN  13  8   CREA  0.85  0.69   CA  8.3*  8.8   AGAP  6  7   BUCR  15  12   AP   --   94   TP   --   7.1   ALB   --   2.9*   GLOB   --   4.2*   AGRAT   --   0.7*      CBC w/Diff Recent Labs      06/11/18   0240  06/10/18   1132   WBC  6.0  7.2   RBC  3.23*  3.68*   HGB  9.9*  11.4*   HCT  30.5*  34.2*   PLT  206  236   GRANS   --   57   LYMPH   --   36   EOS   --   1      Cardiac Enzymes Recent Labs      06/11/18   1900  06/11/18   1455   CPK  57  56   CKND1  CALCULATION NOT PERFORMED WHEN RESULT IS BELOW LINEAR LIMIT  CALCULATION NOT PERFORMED WHEN RESULT IS BELOW LINEAR LIMIT      Coagulation Recent Labs      06/10/18   1132   PTP  12.7   INR  1.0       Lipid Panel Lab Results   Component Value Date/Time    Cholesterol, total 179 02/07/2017 06:15 AM    HDL Cholesterol 37 (L) 02/07/2017 06:15 AM    LDL, calculated 126.6 (H) 02/07/2017 06:15 AM    VLDL, calculated 15.4 02/07/2017 06:15 AM    Triglyceride 77 02/07/2017 06:15 AM    CHOL/HDL Ratio 4.8 02/07/2017 06:15 AM      BNP No results for input(s): BNPP in the last 72 hours.    Liver Enzymes Recent Labs      06/10/18   1132   TP  7.1   ALB  2.9*   AP  94   SGOT  9*      Thyroid Studies Lab Results   Component Value Date/Time    TSH 0.51 02/27/2018 10:11 AM              Wagner Lynn MD   Pager # [phone]

## 2024-09-23 ENCOUNTER — RESULT REVIEW (OUTPATIENT)
Age: 49
End: 2024-09-23

## 2024-09-23 LAB
A1C WITH ESTIMATED AVERAGE GLUCOSE RESULT: 7.8 % — HIGH (ref 4–5.6)
ALBUMIN SERPL ELPH-MCNC: 2.7 G/DL — LOW (ref 3.3–5)
ALP SERPL-CCNC: 54 U/L — SIGNIFICANT CHANGE UP (ref 40–120)
ALT FLD-CCNC: 23 U/L — SIGNIFICANT CHANGE UP (ref 12–78)
ANION GAP SERPL CALC-SCNC: 10 MMOL/L — SIGNIFICANT CHANGE UP (ref 5–17)
AST SERPL-CCNC: 13 U/L — LOW (ref 15–37)
BASOPHILS # BLD AUTO: 0.01 K/UL — SIGNIFICANT CHANGE UP (ref 0–0.2)
BASOPHILS NFR BLD AUTO: 0.1 % — SIGNIFICANT CHANGE UP (ref 0–2)
BILIRUB SERPL-MCNC: 0.7 MG/DL — SIGNIFICANT CHANGE UP (ref 0.2–1.2)
BUN SERPL-MCNC: 11 MG/DL — SIGNIFICANT CHANGE UP (ref 7–23)
CALCIUM SERPL-MCNC: 8.6 MG/DL — SIGNIFICANT CHANGE UP (ref 8.5–10.1)
CHLORIDE SERPL-SCNC: 109 MMOL/L — HIGH (ref 96–108)
CO2 SERPL-SCNC: 20 MMOL/L — LOW (ref 22–31)
CREAT SERPL-MCNC: 0.63 MG/DL — SIGNIFICANT CHANGE UP (ref 0.5–1.3)
EGFR: 110 ML/MIN/1.73M2 — SIGNIFICANT CHANGE UP
EOSINOPHIL # BLD AUTO: 0.02 K/UL — SIGNIFICANT CHANGE UP (ref 0–0.5)
EOSINOPHIL NFR BLD AUTO: 0.2 % — SIGNIFICANT CHANGE UP (ref 0–6)
ERYTHROCYTE [SEDIMENTATION RATE] IN BLOOD: 25 MM/HR — HIGH (ref 0–15)
ESTIMATED AVERAGE GLUCOSE: 177 MG/DL — HIGH (ref 68–114)
GLUCOSE SERPL-MCNC: 135 MG/DL — HIGH (ref 70–99)
HCT VFR BLD CALC: 36.9 % — SIGNIFICANT CHANGE UP (ref 34.5–45)
HGB BLD-MCNC: 11.5 G/DL — SIGNIFICANT CHANGE UP (ref 11.5–15.5)
IMM GRANULOCYTES NFR BLD AUTO: 0.7 % — SIGNIFICANT CHANGE UP (ref 0–0.9)
LYMPHOCYTES # BLD AUTO: 1.12 K/UL — SIGNIFICANT CHANGE UP (ref 1–3.3)
LYMPHOCYTES # BLD AUTO: 10.1 % — LOW (ref 13–44)
MCHC RBC-ENTMCNC: 27.3 PG — SIGNIFICANT CHANGE UP (ref 27–34)
MCHC RBC-ENTMCNC: 31.2 GM/DL — LOW (ref 32–36)
MCV RBC AUTO: 87.6 FL — SIGNIFICANT CHANGE UP (ref 80–100)
MONOCYTES # BLD AUTO: 1.01 K/UL — HIGH (ref 0–0.9)
MONOCYTES NFR BLD AUTO: 9.1 % — SIGNIFICANT CHANGE UP (ref 2–14)
NEUTROPHILS # BLD AUTO: 8.84 K/UL — HIGH (ref 1.8–7.4)
NEUTROPHILS NFR BLD AUTO: 79.8 % — HIGH (ref 43–77)
NRBC # BLD: 0 /100 WBCS — SIGNIFICANT CHANGE UP (ref 0–0)
PLATELET # BLD AUTO: 283 K/UL — SIGNIFICANT CHANGE UP (ref 150–400)
POTASSIUM SERPL-MCNC: 3.6 MMOL/L — SIGNIFICANT CHANGE UP (ref 3.5–5.3)
POTASSIUM SERPL-SCNC: 3.6 MMOL/L — SIGNIFICANT CHANGE UP (ref 3.5–5.3)
PROCALCITONIN SERPL-MCNC: 4.19 NG/ML — HIGH
PROT SERPL-MCNC: 6.6 G/DL — SIGNIFICANT CHANGE UP (ref 6–8.3)
RBC # BLD: 4.21 M/UL — SIGNIFICANT CHANGE UP (ref 3.8–5.2)
RBC # FLD: 14.8 % — HIGH (ref 10.3–14.5)
SODIUM SERPL-SCNC: 139 MMOL/L — SIGNIFICANT CHANGE UP (ref 135–145)
WBC # BLD: 11.08 K/UL — HIGH (ref 3.8–10.5)
WBC # FLD AUTO: 11.08 K/UL — HIGH (ref 3.8–10.5)

## 2024-09-23 PROCEDURE — 71250 CT THORAX DX C-: CPT | Mod: 26

## 2024-09-23 PROCEDURE — 99233 SBSQ HOSP IP/OBS HIGH 50: CPT

## 2024-09-23 PROCEDURE — 93306 TTE W/DOPPLER COMPLETE: CPT | Mod: 26

## 2024-09-23 RX ORDER — SODIUM CHLORIDE 0.9 % (FLUSH) 0.9 %
1000 SYRINGE (ML) INJECTION
Refills: 0 | Status: DISCONTINUED | OUTPATIENT
Start: 2024-09-23 | End: 2024-09-24

## 2024-09-23 RX ADMIN — Medication 5000 UNIT(S): at 14:05

## 2024-09-23 RX ADMIN — Medication 5000 UNIT(S): at 21:42

## 2024-09-23 RX ADMIN — Medication 2: at 12:23

## 2024-09-23 RX ADMIN — Medication 1: at 17:27

## 2024-09-23 RX ADMIN — Medication 5000 UNIT(S): at 05:22

## 2024-09-23 RX ADMIN — ROSUVASTATIN CALCIUM 10 MILLIGRAM(S): 20 TABLET, COATED ORAL at 21:41

## 2024-09-23 NOTE — PROGRESS NOTE ADULT - SUBJECTIVE AND OBJECTIVE BOX
MARICRUZ SANCHES is a 49yFemale , patient examined and chart reviewed.     INTERVAL HPI/ OVERNIGHT EVENTS:   Feels well No further fevers.    PAST MEDICAL & SURGICAL HISTORY:  Fibroids      For details regarding the patient's social history, family history, and other miscellaneous elements, please refer the initial infectious diseases consultation and/or the admitting history and physical examination for this admission.    ROS:  CONSTITUTIONAL:  Negative fever or chills  EYES:  Negative  blurry vision or double vision  CARDIOVASCULAR:  Negative for chest pain or palpitations  RESPIRATORY:  Negative for cough, wheezing, or SOB   GASTROINTESTINAL:  Negative for nausea, vomiting, diarrhea, constipation, or abdominal pain  GENITOURINARY:  Negative frequency, urgency or dysuria  NEUROLOGIC:  No headache, confusion, dizziness, lightheadedness  All other systems were reviewed and are negative     ALLERGIES:  Zosyn (Rash)      Current inpatient medications :    ANTIBIOTICS/RELEVANT:      acetaminophen     Tablet .. 650 milliGRAM(s) Oral every 6 hours PRN  dextrose 5%. 1000 milliLiter(s) IV Continuous <Continuous>  dextrose 5%. 1000 milliLiter(s) IV Continuous <Continuous>  dextrose 50% Injectable 25 Gram(s) IV Push once  dextrose 50% Injectable 12.5 Gram(s) IV Push once  dextrose 50% Injectable 25 Gram(s) IV Push once  dextrose Oral Gel 15 Gram(s) Oral once PRN  glucagon  Injectable 1 milliGRAM(s) IntraMuscular once  heparin   Injectable 5000 Unit(s) SubCutaneous every 8 hours  insulin lispro (ADMELOG) corrective regimen sliding scale   SubCutaneous three times a day before meals  insulin lispro (ADMELOG) corrective regimen sliding scale   SubCutaneous at bedtime  rosuvastatin 10 milliGRAM(s) Oral at bedtime  sodium chloride 0.9%. 1000 milliLiter(s) IV Continuous <Continuous>      Objective:    T(C): 37.1 (09-23-24 @ 04:57), Max: 37.1 (09-23-24 @ 04:57)  HR: 62 (09-23-24 @ 04:57) (62 - 62)  BP: 83/44 (09-23-24 @ 04:57) (83/44 - 83/44)  RR: 18 (09-23-24 @ 04:57) (18 - 18)  SpO2: 92% (09-23-24 @ 04:57) (92% - 92%)      Physical Exam:  General: no acute distress  Neck: supple, trachea midline  Lungs: clear, no wheeze/rhonchi  Cardiovascular: regular rate and rhythm, S1 S2  Abdomen: soft, nontender,  bowel sounds normal  Neurological: alert and oriented x3  Skin: no rash  Extremities: no edema      LABS:                        11.5   11.08 )-----------( 283      ( 23 Sep 2024 07:10 )             36.9       09-23    139  |  109[H]  |  11  ----------------------------<  135[H]  3.6   |  20[L]  |  0.63    Ca    8.6      23 Sep 2024 07:10    TPro  6.6  /  Alb  2.7[L]  /  TBili  0.7  /  DBili  x   /  AST  13[L]  /  ALT  23  /  AlkPhos  54  09-23      PT/INR - ( 22 Sep 2024 09:00 )   PT: 14.0 sec;   INR: 1.24 ratio         PTT - ( 22 Sep 2024 09:00 )  PTT:28.7 sec    MICROBIOLOGY:    Culture - Blood (collected 22 Sep 2024 06:55)  Source: .Blood Blood-Peripheral  Preliminary Report (23 Sep 2024 11:01):    No growth at 24 hours    Culture - Blood (collected 22 Sep 2024 06:40)  Source: .Blood Blood-Peripheral  Preliminary Report (23 Sep 2024 11:01):    No growth at 24 hours    RADIOLOGY & ADDITIONAL STUDIES:  INTERPRETATION:  CLINICAL INFORMATION: Abdominal pain, fever, and chills.   Right upper quadrant pain per EMR.    COMPARISON: Pelvic ultrasound 11/30/2015    CONTRAST/COMPLICATIONS:  IV Contrast: Omnipaque 350  90 cc administered   10 cc discarded  Oral Contrast: NONE  Complications: None reported at time of study completion    PROCEDURE:  CT of the Abdomen and Pelvis was performed.  Sagittal and coronal reformats were performed.    FINDINGS:    LOWER CHEST: No visualized pleural effusion.    LIVER: Enlarged, 20.7 cm in craniocaudal dimension. Main portal vein and   hepatic veins are patent. Hepatic steatosis.  BILE DUCTS: No distention  GALLBLADDER: Unremarkable CT appearance of the gallbladder. Correlate   with pending ultrasound.  SPLEEN: Spleen size within normal limits  PANCREAS: No acute peripancreatic inflammation  ADRENALS: Left adrenal 6.3 cm myelolipoma. Unremarkable right adrenal.  KIDNEYS/URETERS: No hydronephrosis or obstructing ureteral calculus.    BLADDER: Minimally distended.  REPRODUCTIVE ORGANS: Hysterectomy. Right adnexal 2 cm cyst versus   follicle.    BOWEL: Stomach is underdistended. No small bowel distention. Few   fecalized nondistended small bowel loops are identified. Appendix is not   obstructed. Mild stool burden of the colon and overall under distention   limits evaluation of the colonic mucosa.  PERITONEUM/RETROPERITONEUM: No ascites  VESSELS: No abdominal aortic aneurysm  LYMPH NODES: Small volume nodes not enlarged by CT size criteria.  ABDOMINAL WALL: Anterior abdominal wall laxity/rectus diastases.  BONES: Multilevel spinal degenerative changes.    IMPRESSION:    Etiology of abdominal symptoms is not clearly elucidated.    Enlarged liver with steatosis. Unremarkable CT appearance of the   gallbladder. Correlate with LFTs and pending ultrasound.    Hysterectomy. Right adnexal 2 cm cyst versus follicle.    Left adrenal 6.3 cm myelolipoma.        ACC: [id number] EXAM:  CT CHEST   ORDERED BY: PARRISH VILLEGAS     PROCEDURE DATE:  09/23/2024          INTERPRETATION:  CLINICAL INFORMATION: Given history is a 49-year-old   female with fever. Evaluate for pathology.    COMPARISON: None.    CONTRAST/COMPLICATIONS:  IV Contrast: NONE  Oral Contrast: NONE  Complications: None reported at time of study completion    PROCEDURE:  CT of the Chest was performed.  Sagittal and coronal reformats were performed.    FINDINGS:    LUNGS AND LARGE AIRWAYS: Patent central airways. No pulmonary nodules or   masses. An 8 mm cyst within the left lower lobe series 3 image 224. No   pulmonary consolidation. Linear atelectasis or scarring anteromedially   within the right upper lobe. Trace bibasilar atelectasis and/or pleural   thickening.  PLEURA: No pleural effusion. No pneumothorax.  VESSELS: Within normal limits.  HEART: Heart size is normal. No pericardial effusion. Trace coronary   artery calcifications.   MEDIASTINUM AND LILIA: No enlarged lymphadenopathy. Subcentimeter right   lower paratracheal, subcarinal, right cardiophrenic angle and   aortopulmonary window lymph nodes, not enlarged.  AXILLA: Bilateral Subcentimeter lymph nodes, not enlarged.  CHEST WALL AND LOWER NECK: Within normal limits.  VISUALIZED UPPER ABDOMEN: Hepatic steatosis. A 6.4 cm Left adrenal   myelolipoma.  BONES: Findings suggestive of DISH involving the lower thoracic spine.    IMPRESSION:  Trace bibasilar atelectasis and/or pleural thickening. No pulmonary   consolidation.    Hepatic steatosis.    Left adrenal 6.4 cm myelolipoma, unchanged.      Assessment :   MS Sanches is a 48 yo F PMH DM2, HLD, varicose veins who presented to the hospital with c/o fevers chills In ED Tmax 101.8 wbc 10K. CXR clear CT AP no acute pathology. UA neg. Nontoxic looking. NO recent dental work No recent travel or sick contacts.  Rule out bacteremia- cultures ngtd  CT CAP unrevealing  Echo ok  Procal 4.19 noted   Drug fever? Was on Levaquin and on weekly Ozempic  Viral?      Plan :   Hold antibiotic for now  Fu cultures  Trend temps and cbc  Pulm toileting  If cultures remains neg and pt remains afebrile ok to dc home and follow up with PMD  Stable from ID standpoint      Continue with present regiment.  Appropriate use of antibiotics and adverse effects reviewed.      I have discussed the above plan of care with patient in detail. She expressed understanding of the  treatment plan . Risks, benefits and alternatives discussed in detail. I have asked if she has any questions or concerns and appropriately addressed them to the best of my ability .    > 35 minutes were spent in direct patient care reviewing notes, medications ,labs data/ imaging , discussion with multidisciplinary team.    Thank you for allowing me to participate in care of your patient .    Tomas Villegas MD  Infectious Disease  [phone]

## 2024-09-23 NOTE — PROGRESS NOTE ADULT - SUBJECTIVE AND OBJECTIVE BOX
PROGRESS NOTE:     Patient is a 49y old  Female who presents with a chief complaint of Fever (22 Sep 2024 20:27)          SUBJECTIVE & OBJECTIVE:   Pt seen and examined at bedside in AM    no overnight events.     REVIEW OF SYSTEMS: remaining ROS negative     PHYSICAL EXAM:  VITALS:  Vital Signs Last 24 Hrs  T(C): 37.1 (23 Sep 2024 04:57), Max: 37.1 (22 Sep 2024 17:07)  T(F): 98.7 (23 Sep 2024 04:57), Max: 98.7 (22 Sep 2024 17:07)  HR: 62 (23 Sep 2024 04:57) (61 - 72)  BP: 83/44 (23 Sep 2024 04:57) (83/44 - 110/67)  BP(mean): --  RR: 18 (23 Sep 2024 04:57) (16 - 19)  SpO2: 92% (23 Sep 2024 04:57) (92% - 97%)    Parameters below as of 23 Sep 2024 04:57  Patient On (Oxygen Delivery Method): room air          GENERAL: NAD,  no increased WOB  HEAD:  Atraumatic, Normocephalic  EYES: EOMI, PERRLA, conjunctiva and sclera clear  ENMT: Moist mucous membranes. erythema noted  NECK: Supple, No JVD  NERVOUS SYSTEM:  Alert & Oriented   CHEST/LUNG: Clear to auscultation bilaterally; No rales, rhonchi, wheezing  HEART: Regular rate and rhythm  ABDOMEN: Soft, Nontender, Nondistended; Bowel sounds present  EXTREMITIES:  No clubbing, cyanosis, calf tenderness or edema b/l      MEDICATIONS  (STANDING):  dextrose 5%. 1000 milliLiter(s) (100 mL/Hr) IV Continuous <Continuous>  dextrose 5%. 1000 milliLiter(s) (50 mL/Hr) IV Continuous <Continuous>  dextrose 50% Injectable 25 Gram(s) IV Push once  dextrose 50% Injectable 25 Gram(s) IV Push once  dextrose 50% Injectable 12.5 Gram(s) IV Push once  glucagon  Injectable 1 milliGRAM(s) IntraMuscular once  heparin   Injectable 5000 Unit(s) SubCutaneous every 8 hours  influenza   Vaccine 0.5 milliLiter(s) IntraMuscular once  insulin lispro (ADMELOG) corrective regimen sliding scale   SubCutaneous three times a day before meals  insulin lispro (ADMELOG) corrective regimen sliding scale   SubCutaneous at bedtime  rosuvastatin 10 milliGRAM(s) Oral at bedtime  sodium chloride 0.9%. 1000 milliLiter(s) (80 mL/Hr) IV Continuous <Continuous>    MEDICATIONS  (PRN):  acetaminophen     Tablet .. 650 milliGRAM(s) Oral every 6 hours PRN Temp greater or equal to 38C (100.4F), Mild Pain (1 - 3)  dextrose Oral Gel 15 Gram(s) Oral once PRN Blood Glucose LESS THAN 70 milliGRAM(s)/deciliter      Allergies    Zosyn (Rash)    Intolerances              LABS:                           11.5   11.08 )-----------( 283      ( 23 Sep 2024 07:10 )             36.9     09-23    139  |  109[H]  |  11  ----------------------------<  135[H]  3.6   |  20[L]  |  0.63    Ca    8.6      23 Sep 2024 07:10    TPro  6.6  /  Alb  2.7[L]  /  TBili  0.7  /  DBili  x   /  AST  13[L]  /  ALT  23  /  AlkPhos  54  09-23    PT/INR - ( 22 Sep 2024 09:00 )   PT: 14.0 sec;   INR: 1.24 ratio         PTT - ( 22 Sep 2024 09:00 )  PTT:28.7 sec  Urinalysis Basic - ( 23 Sep 2024 07:10 )    Color: x / Appearance: x / SG: x / pH: x  Gluc: 135 mg/dL / Ketone: x  / Bili: x / Urobili: x   Blood: x / Protein: x / Nitrite: x   Leuk Esterase: x / RBC: x / WBC x   Sq Epi: x / Non Sq Epi: x / Bacteria: x      CAPILLARY BLOOD GLUCOSE      POCT Blood Glucose.: 233 mg/dL (23 Sep 2024 12:17)  POCT Blood Glucose.: 192 mg/dL (22 Sep 2024 16:40)                  RECENT CULTURES:    Culture - Blood (collected 22 Sep 2024 06:55)  Source: .Blood Blood-Peripheral  Preliminary Report (23 Sep 2024 11:01):    No growth at 24 hours    Urinalysis with Rflx Culture (collected 22 Sep 2024 06:55)    Culture - Blood (collected 22 Sep 2024 06:40)  Source: .Blood Blood-Peripheral  Preliminary Report (23 Sep 2024 11:01):    No growth at 24 hours      RADIOLOGY & ADDITIONAL TESTS:      < from: CT Chest No Cont (09.23.24 @ 08:01) >  IMPRESSION:  Trace bibasilar atelectasis and/or pleural thickening. No pulmonary   consolidation.    Hepatic steatosis.    Left adrenal 6.4 cm myelolipoma, unchanged.    < end of copied text >      < from: CT Abdomen and Pelvis w/ IV Cont (09.22.24 @ 08:24) >  Enlarged liver with steatosis. Unremarkable CT appearance of the   gallbladder. Correlate with LFTs and pending ultrasound.    Hysterectomy. Right adnexal 2 cm cyst versus follicle.    Left adrenal 6.3 cm myelolipoma.    < end of copied text >

## 2024-09-23 NOTE — CARE COORDINATION ASSESSMENT. - OTHER PERTINENT DISCHARGE PLANNING INFORMATION:
CM met with patient at bedside, introduced self and explained role of CM and transitional care planning. She verbalized understanding. Patient is A&Ox4, resides in private home with spouse and children. Reports being independent PTA without use of DME. Denies any active HCS or HHA services. Drives. PCP is Trae Wood. Pharmacy is [facility] on [address]. When DC ready, family/friend to transport home. No anticipated DC needs identified at present. CM will continue to follow.

## 2024-09-23 NOTE — CARE COORDINATION ASSESSMENT. - NSCAREPROVIDERS_GEN_ALL_CORE_FT
CARE PROVIDERS:  Accepting Physician: Slava Matt  Access Services: Dalton Jorgensen  Administration: Raúl Gatica  Administration: Ulisses Diaz  Admitting: Slava Matt  Attending: Savi Vu  Cardiology Technician: Capri Lao  Consultant: Maurilio Schroeder  Consultant: Tomas Khan  Covering Team: Dary Mcgovern  ED Attending: Nghia Hyde  ED Attending2: Guillermo Currie  ED Nurse: Emmanuel Martin  Nurse: Chary Butler  Nurse: Dave Diaz  Nurse: Claudine Song  Ordered: Doctor, Unknown  Override: Chary Butler  Override: Mulu Horta  Override: Lynn Ramos  Override: Brodie Perez  PCA/Nursing Assistant: France Hartmann  PCA/Nursing Assistant: Amparo Thapa  PCA/Nursing Assistant: Link Mojica  Primary Team: Slava Matt  Primary Team: Niecy Hamm  Primary Team: Savi Vu  Registered Dietitian: Radha Pedraza  : Demetrice Harris  Team: [facility], Team

## 2024-09-24 ENCOUNTER — TRANSCRIPTION ENCOUNTER (OUTPATIENT)
Age: 49
End: 2024-09-24

## 2024-09-24 VITALS
RESPIRATION RATE: 17 BRPM | DIASTOLIC BLOOD PRESSURE: 74 MMHG | HEART RATE: 52 BPM | SYSTOLIC BLOOD PRESSURE: 122 MMHG | TEMPERATURE: 99 F | OXYGEN SATURATION: 96 %

## 2024-09-24 DIAGNOSIS — D17.9 BENIGN LIPOMATOUS NEOPLASM, UNSPECIFIED: ICD-10-CM

## 2024-09-24 DIAGNOSIS — E11.9 TYPE 2 DIABETES MELLITUS WITHOUT COMPLICATIONS: ICD-10-CM

## 2024-09-24 DIAGNOSIS — Z29.9 ENCOUNTER FOR PROPHYLACTIC MEASURES, UNSPECIFIED: ICD-10-CM

## 2024-09-24 DIAGNOSIS — R50.9 FEVER, UNSPECIFIED: ICD-10-CM

## 2024-09-24 DIAGNOSIS — D17.79 BENIGN LIPOMATOUS NEOPLASM OF OTHER SITES: ICD-10-CM

## 2024-09-24 DIAGNOSIS — E78.5 HYPERLIPIDEMIA, UNSPECIFIED: ICD-10-CM

## 2024-09-24 LAB
ANA TITR SER: NEGATIVE — SIGNIFICANT CHANGE UP
ANION GAP SERPL CALC-SCNC: 6 MMOL/L — SIGNIFICANT CHANGE UP (ref 5–17)
AUTO DIFF PNL BLD: ABNORMAL
BASOPHILS # BLD AUTO: 0.03 K/UL — SIGNIFICANT CHANGE UP (ref 0–0.2)
BASOPHILS NFR BLD AUTO: 0.4 % — SIGNIFICANT CHANGE UP (ref 0–2)
BILIRUB SERPL-MCNC: 0.6 MG/DL — SIGNIFICANT CHANGE UP (ref 0.2–1.2)
BUN SERPL-MCNC: 11 MG/DL — SIGNIFICANT CHANGE UP (ref 7–23)
C-ANCA SER-ACNC: NEGATIVE — SIGNIFICANT CHANGE UP
CALCIUM SERPL-MCNC: 8.2 MG/DL — LOW (ref 8.5–10.1)
CHLORIDE SERPL-SCNC: 110 MMOL/L — HIGH (ref 96–108)
CO2 SERPL-SCNC: 25 MMOL/L — SIGNIFICANT CHANGE UP (ref 22–31)
CREAT SERPL-MCNC: 0.59 MG/DL — SIGNIFICANT CHANGE UP (ref 0.5–1.3)
EGFR: 112 ML/MIN/1.73M2 — SIGNIFICANT CHANGE UP
EOSINOPHIL # BLD AUTO: 0.34 K/UL — SIGNIFICANT CHANGE UP (ref 0–0.5)
EOSINOPHIL NFR BLD AUTO: 4.8 % — SIGNIFICANT CHANGE UP (ref 0–6)
GLUCOSE SERPL-MCNC: 127 MG/DL — HIGH (ref 70–99)
HCT VFR BLD CALC: 36 % — SIGNIFICANT CHANGE UP (ref 34.5–45)
HGB BLD-MCNC: 11.5 G/DL — SIGNIFICANT CHANGE UP (ref 11.5–15.5)
IMM GRANULOCYTES NFR BLD AUTO: 1.5 % — HIGH (ref 0–0.9)
INR BLD: 1.02 RATIO — SIGNIFICANT CHANGE UP (ref 0.85–1.18)
LYMPHOCYTES # BLD AUTO: 2.9 K/UL — SIGNIFICANT CHANGE UP (ref 1–3.3)
LYMPHOCYTES # BLD AUTO: 40.8 % — SIGNIFICANT CHANGE UP (ref 13–44)
MCHC RBC-ENTMCNC: 27.6 PG — SIGNIFICANT CHANGE UP (ref 27–34)
MCHC RBC-ENTMCNC: 31.9 GM/DL — LOW (ref 32–36)
MCV RBC AUTO: 86.5 FL — SIGNIFICANT CHANGE UP (ref 80–100)
MELD SCORE WITH DIALYSIS: 20 POINTS — SIGNIFICANT CHANGE UP
MELD SCORE WITHOUT DIALYSIS: 7 POINTS — SIGNIFICANT CHANGE UP
MONOCYTES # BLD AUTO: 0.81 K/UL — SIGNIFICANT CHANGE UP (ref 0–0.9)
MONOCYTES NFR BLD AUTO: 11.4 % — SIGNIFICANT CHANGE UP (ref 2–14)
MPO AB + PR3 PNL SER: SIGNIFICANT CHANGE UP
NEUTROPHILS # BLD AUTO: 2.92 K/UL — SIGNIFICANT CHANGE UP (ref 1.8–7.4)
NEUTROPHILS NFR BLD AUTO: 41.1 % — LOW (ref 43–77)
NRBC # BLD: 0 /100 WBCS — SIGNIFICANT CHANGE UP (ref 0–0)
P-ANCA SER-ACNC: NEGATIVE — SIGNIFICANT CHANGE UP
PLATELET # BLD AUTO: 263 K/UL — SIGNIFICANT CHANGE UP (ref 150–400)
POTASSIUM SERPL-MCNC: 3.6 MMOL/L — SIGNIFICANT CHANGE UP (ref 3.5–5.3)
POTASSIUM SERPL-SCNC: 3.6 MMOL/L — SIGNIFICANT CHANGE UP (ref 3.5–5.3)
PROTHROM AB SERPL-ACNC: 11.6 SEC — SIGNIFICANT CHANGE UP (ref 9.5–13)
RBC # BLD: 4.16 M/UL — SIGNIFICANT CHANGE UP (ref 3.8–5.2)
RBC # FLD: 14.6 % — HIGH (ref 10.3–14.5)
SODIUM SERPL-SCNC: 141 MMOL/L — SIGNIFICANT CHANGE UP (ref 135–145)
WBC # BLD: 7.11 K/UL — SIGNIFICANT CHANGE UP (ref 3.8–10.5)
WBC # FLD AUTO: 7.11 K/UL — SIGNIFICANT CHANGE UP (ref 3.8–10.5)

## 2024-09-24 PROCEDURE — 85652 RBC SED RATE AUTOMATED: CPT

## 2024-09-24 PROCEDURE — 93306 TTE W/DOPPLER COMPLETE: CPT

## 2024-09-24 PROCEDURE — 81003 URINALYSIS AUTO W/O SCOPE: CPT

## 2024-09-24 PROCEDURE — 80053 COMPREHEN METABOLIC PANEL: CPT

## 2024-09-24 PROCEDURE — 99239 HOSP IP/OBS DSCHRG MGMT >30: CPT

## 2024-09-24 PROCEDURE — 85610 PROTHROMBIN TIME: CPT

## 2024-09-24 PROCEDURE — 83605 ASSAY OF LACTIC ACID: CPT

## 2024-09-24 PROCEDURE — 85025 COMPLETE CBC W/AUTO DIFF WBC: CPT

## 2024-09-24 PROCEDURE — 36415 COLL VENOUS BLD VENIPUNCTURE: CPT

## 2024-09-24 PROCEDURE — 76705 ECHO EXAM OF ABDOMEN: CPT

## 2024-09-24 PROCEDURE — 71250 CT THORAX DX C-: CPT | Mod: MC

## 2024-09-24 PROCEDURE — 86038 ANTINUCLEAR ANTIBODIES: CPT

## 2024-09-24 PROCEDURE — 84145 PROCALCITONIN (PCT): CPT

## 2024-09-24 PROCEDURE — 99285 EMERGENCY DEPT VISIT HI MDM: CPT

## 2024-09-24 PROCEDURE — 93005 ELECTROCARDIOGRAM TRACING: CPT

## 2024-09-24 PROCEDURE — 96366 THER/PROPH/DIAG IV INF ADDON: CPT

## 2024-09-24 PROCEDURE — 87040 BLOOD CULTURE FOR BACTERIA: CPT

## 2024-09-24 PROCEDURE — 86036 ANCA SCREEN EACH ANTIBODY: CPT

## 2024-09-24 PROCEDURE — 80048 BASIC METABOLIC PNL TOTAL CA: CPT

## 2024-09-24 PROCEDURE — 84702 CHORIONIC GONADOTROPIN TEST: CPT

## 2024-09-24 PROCEDURE — 74177 CT ABD & PELVIS W/CONTRAST: CPT | Mod: MC

## 2024-09-24 PROCEDURE — 82247 BILIRUBIN TOTAL: CPT

## 2024-09-24 PROCEDURE — 83690 ASSAY OF LIPASE: CPT

## 2024-09-24 PROCEDURE — 71045 X-RAY EXAM CHEST 1 VIEW: CPT

## 2024-09-24 PROCEDURE — 85730 THROMBOPLASTIN TIME PARTIAL: CPT

## 2024-09-24 PROCEDURE — 96365 THER/PROPH/DIAG IV INF INIT: CPT

## 2024-09-24 PROCEDURE — 85027 COMPLETE CBC AUTOMATED: CPT

## 2024-09-24 PROCEDURE — 83036 HEMOGLOBIN GLYCOSYLATED A1C: CPT

## 2024-09-24 PROCEDURE — 82962 GLUCOSE BLOOD TEST: CPT

## 2024-09-24 PROCEDURE — 0225U NFCT DS DNA&RNA 21 SARSCOV2: CPT

## 2024-09-24 RX ADMIN — Medication 1: at 12:45

## 2024-09-24 RX ADMIN — Medication 5000 UNIT(S): at 05:32

## 2024-09-24 NOTE — PROGRESS NOTE ADULT - PROBLEM SELECTOR PLAN 1
Fever: etiology unclear, likely secondary to viral syndrome  -Pt is . reports of bodyaches, sweats, dry cough - these sx has since resolved and she has no complaints  -CT scans and RUQ U/S reviewed  -RVP negative  -bcx NGTD - will continue to monitor  -elevated lactate and procalc --> lactate has cleared at 1.6  -monitor for further fever, trend WBC count  -C/w IVF NS at 80 ml/hr, monitor off abx  -f/u strep PCR  -ID consulted --> hold abx and follow cultures at present

## 2024-09-24 NOTE — DISCHARGE NOTE PROVIDER - NSDCMRMEDTOKEN_GEN_ALL_CORE_FT
Jardiance 10 mg oral tablet: 1 tab(s) orally once a day  Ozempic 2 mg/1.5 mL (0.25 mg or 0.5 mg dose) subcutaneous solution: 0.5 milligram(s) subcutaneously once a week  rosuvastatin 10 mg oral capsule: 1 cap(s) orally once a day

## 2024-09-24 NOTE — DISCHARGE NOTE PROVIDER - NSDCCPCAREPLAN_GEN_ALL_CORE_FT
PRINCIPAL DISCHARGE DIAGNOSIS  Diagnosis: Systemic inflammatory response syndrome (SIRS)  Assessment and Plan of Treatment: You came to the hospital and you met this criteria, over the course of your hospital stay you improved greatly with fluids and monitoring. It was determined that you were safe to be sent home not on antibiotics from the medicine and infectious disease team      SECONDARY DISCHARGE DIAGNOSES  Diagnosis: Adrenal myelolipoma  Assessment and Plan of Treatment: You have a small fatty/muscular deposit in your adrenal gland that was incidently found. You will follow up with your PCP to determine if further testing is needed    Diagnosis: Fatty liver  Assessment and Plan of Treatment: follow up with your PCP, they will help determine if further intervention, medication or testing is needed     PRINCIPAL DISCHARGE DIAGNOSIS  Diagnosis: Systemic inflammatory response syndrome (SIRS)  Assessment and Plan of Treatment: You came to the hospital for fever and myalgias, over the course of your hospital stay you improved greatly with fluids and monitoring. It is likely that your fever was viral or possibly related to recent antibiotic use. It was determined that you were safe to be sent home not on antibiotics from the medicine and infectious disease team. Please see ypur pmd or return to the er should your symptoms return.      SECONDARY DISCHARGE DIAGNOSES  Diagnosis: Adrenal myelolipoma  Assessment and Plan of Treatment: You have a small fatty/muscular deposit in your adrenal gland that was incidently found. You will follow up with your PCP to determine if further testing is needed    Diagnosis: Fatty liver  Assessment and Plan of Treatment: follow up with your PCP, they will help determine if further intervention, medication or testing is needed

## 2024-09-24 NOTE — PROGRESS NOTE ADULT - SUBJECTIVE AND OBJECTIVE BOX
MARICRUZ SANCHES is a 49yFemale , patient examined and chart reviewed.     INTERVAL HPI/ OVERNIGHT EVENTS:   Feels well Afebrile.  No events.     PAST MEDICAL & SURGICAL HISTORY:  Fibroids      For details regarding the patient's social history, family history, and other miscellaneous elements, please refer the initial infectious diseases consultation and/or the admitting history and physical examination for this admission.    ROS:  CONSTITUTIONAL:  Negative fever or chills  EYES:  Negative  blurry vision or double vision  CARDIOVASCULAR:  Negative for chest pain or palpitations  RESPIRATORY:  Negative for cough, wheezing, or SOB   GASTROINTESTINAL:  Negative for nausea, vomiting, diarrhea, constipation, or abdominal pain  GENITOURINARY:  Negative frequency, urgency or dysuria  NEUROLOGIC:  No headache, confusion, dizziness, lightheadedness  All other systems were reviewed and are negative     ALLERGIES:  Zosyn (Rash)      Current inpatient medications :    ANTIBIOTICS/RELEVANT:    MEDICATIONS  (STANDING):  dextrose 5%. 1000 milliLiter(s) (100 mL/Hr) IV Continuous <Continuous>  dextrose 5%. 1000 milliLiter(s) (50 mL/Hr) IV Continuous <Continuous>  dextrose 50% Injectable 25 Gram(s) IV Push once  dextrose 50% Injectable 25 Gram(s) IV Push once  dextrose 50% Injectable 12.5 Gram(s) IV Push once  glucagon  Injectable 1 milliGRAM(s) IntraMuscular once  heparin   Injectable 5000 Unit(s) SubCutaneous every 8 hours  influenza   Vaccine 0.5 milliLiter(s) IntraMuscular once  insulin lispro (ADMELOG) corrective regimen sliding scale   SubCutaneous three times a day before meals  insulin lispro (ADMELOG) corrective regimen sliding scale   SubCutaneous at bedtime  rosuvastatin 10 milliGRAM(s) Oral at bedtime  sodium chloride 0.9%. 1000 milliLiter(s) (80 mL/Hr) IV Continuous <Continuous>    MEDICATIONS  (PRN):  acetaminophen     Tablet .. 650 milliGRAM(s) Oral every 6 hours PRN Temp greater or equal to 38C (100.4F), Mild Pain (1 - 3)  dextrose Oral Gel 15 Gram(s) Oral once PRN Blood Glucose LESS THAN 70 milliGRAM(s)/deciliter      Objective:  Vital Signs Last 24 Hrs  T(C): 37 (24 Sep 2024 12:23), Max: 37 (24 Sep 2024 12:23)  T(F): 98.6 (24 Sep 2024 12:23), Max: 98.6 (24 Sep 2024 12:23)  HR: 52 (24 Sep 2024 12:23) (48 - 53)  BP: 122/74 (24 Sep 2024 12:23) (100/52 - 122/74)  RR: 17 (24 Sep 2024 12:23) (17 - 18)  SpO2: 96% (24 Sep 2024 12:23) (94% - 96%)    Parameters below as of 24 Sep 2024 12:23  Patient On (Oxygen Delivery Method): room air      Physical Exam:  General: no acute distress  Neck: supple, trachea midline  Lungs: clear, no wheeze/rhonchi  Cardiovascular: regular rate and rhythm, S1 S2  Abdomen: soft, nontender,  bowel sounds normal  Neurological: alert and oriented x3  Skin: no rash  Extremities: no edema      LABS:                                 11.5   7.11  )-----------( 263      ( 24 Sep 2024 07:40 )             36.0   09-24    141  |  110[H]  |  11  ----------------------------<  127[H]  3.6   |  25  |  0.59    Ca    8.2[L]      24 Sep 2024 07:40    TPro  x   /  Alb  x   /  TBili  0.6  /  DBili  x   /  AST  x   /  ALT  x   /  AlkPhos  x   09-24       PTT - ( 22 Sep 2024 09:00 )  PTT:28.7 sec    MICROBIOLOGY:  Culture - Blood (collected 22 Sep 2024 06:55)  Source: .Blood Blood-Peripheral  Preliminary Report (23 Sep 2024 11:01):    No growth at 24 hours    Culture - Blood (collected 22 Sep 2024 06:40)  Source: .Blood Blood-Peripheral  Preliminary Report (23 Sep 2024 11:01):    No growth at 24 hours    RADIOLOGY & ADDITIONAL STUDIES:  INTERPRETATION:  CLINICAL INFORMATION: Abdominal pain, fever, and chills.   Right upper quadrant pain per EMR.    COMPARISON: Pelvic ultrasound 11/30/2015    CONTRAST/COMPLICATIONS:  IV Contrast: Omnipaque 350  90 cc administered   10 cc discarded  Oral Contrast: NONE  Complications: None reported at time of study completion    PROCEDURE:  CT of the Abdomen and Pelvis was performed.  Sagittal and coronal reformats were performed.    FINDINGS:    LOWER CHEST: No visualized pleural effusion.    LIVER: Enlarged, 20.7 cm in craniocaudal dimension. Main portal vein and   hepatic veins are patent. Hepatic steatosis.  BILE DUCTS: No distention  GALLBLADDER: Unremarkable CT appearance of the gallbladder. Correlate   with pending ultrasound.  SPLEEN: Spleen size within normal limits  PANCREAS: No acute peripancreatic inflammation  ADRENALS: Left adrenal 6.3 cm myelolipoma. Unremarkable right adrenal.  KIDNEYS/URETERS: No hydronephrosis or obstructing ureteral calculus.    BLADDER: Minimally distended.  REPRODUCTIVE ORGANS: Hysterectomy. Right adnexal 2 cm cyst versus   follicle.    BOWEL: Stomach is underdistended. No small bowel distention. Few   fecalized nondistended small bowel loops are identified. Appendix is not   obstructed. Mild stool burden of the colon and overall under distention   limits evaluation of the colonic mucosa.  PERITONEUM/RETROPERITONEUM: No ascites  VESSELS: No abdominal aortic aneurysm  LYMPH NODES: Small volume nodes not enlarged by CT size criteria.  ABDOMINAL WALL: Anterior abdominal wall laxity/rectus diastases.  BONES: Multilevel spinal degenerative changes.    IMPRESSION:    Etiology of abdominal symptoms is not clearly elucidated.    Enlarged liver with steatosis. Unremarkable CT appearance of the   gallbladder. Correlate with LFTs and pending ultrasound.    Hysterectomy. Right adnexal 2 cm cyst versus follicle.    Left adrenal 6.3 cm myelolipoma.        ACC: [id number] EXAM:  CT CHEST   ORDERED BY: PARRISH VILLEGAS     PROCEDURE DATE:  09/23/2024          INTERPRETATION:  CLINICAL INFORMATION: Given history is a 49-year-old   female with fever. Evaluate for pathology.    COMPARISON: None.    CONTRAST/COMPLICATIONS:  IV Contrast: NONE  Oral Contrast: NONE  Complications: None reported at time of study completion    PROCEDURE:  CT of the Chest was performed.  Sagittal and coronal reformats were performed.    FINDINGS:    LUNGS AND LARGE AIRWAYS: Patent central airways. No pulmonary nodules or   masses. An 8 mm cyst within the left lower lobe series 3 image 224. No   pulmonary consolidation. Linear atelectasis or scarring anteromedially   within the right upper lobe. Trace bibasilar atelectasis and/or pleural   thickening.  PLEURA: No pleural effusion. No pneumothorax.  VESSELS: Within normal limits.  HEART: Heart size is normal. No pericardial effusion. Trace coronary   artery calcifications.   MEDIASTINUM AND LILIA: No enlarged lymphadenopathy. Subcentimeter right   lower paratracheal, subcarinal, right cardiophrenic angle and   aortopulmonary window lymph nodes, not enlarged.  AXILLA: Bilateral Subcentimeter lymph nodes, not enlarged.  CHEST WALL AND LOWER NECK: Within normal limits.  VISUALIZED UPPER ABDOMEN: Hepatic steatosis. A 6.4 cm Left adrenal   myelolipoma.  BONES: Findings suggestive of DISH involving the lower thoracic spine.    IMPRESSION:  Trace bibasilar atelectasis and/or pleural thickening. No pulmonary   consolidation.    Hepatic steatosis.    Left adrenal 6.4 cm myelolipoma, unchanged.      Assessment :   MS Sanches is a 50 yo F PMH DM2, HLD, varicose veins who presented to the hospital with c/o fevers chills In ED Tmax 101.8 wbc 10K. CXR clear CT AP no acute pathology. UA neg. Nontoxic looking. NO recent dental work No recent travel or sick contacts.  CT CAP unrevealing  Echo ok  Procal 4.19 noted   Drug fever? Was on Levaquin and on weekly Ozempic  Viral?  All cultures neg Afebrile wbc wnl  Clinically doing well      Plan :   Monitor off antibiotics  Ok to dc home and follow up with PMD  Stable from ID standpoint      Continue with present regiment.  Appropriate use of antibiotics and adverse effects reviewed.      I have discussed the above plan of care with patient in detail. She expressed understanding of the  treatment plan . Risks, benefits and alternatives discussed in detail. I have asked if she has any questions or concerns and appropriately addressed them to the best of my ability .    > 35 minutes were spent in direct patient care reviewing notes, medications ,labs data/ imaging , discussion with multidisciplinary team.    Thank you for allowing me to participate in care of your patient .    Tomas Villegas MD  Infectious Disease  [phone]

## 2024-09-24 NOTE — DISCHARGE NOTE PROVIDER - CARE PROVIDER_API CALL
Tomas Khan  Infectious Disease  [address] [facility] [address]  [place] [address]  Phone: [phone]  Fax: [phone]  Follow Up Time: 2 weeks

## 2024-09-24 NOTE — PROGRESS NOTE ADULT - PROBLEM SELECTOR PLAN 4
Imaging findings:    Right adnexal 2 cm cyst versus follicle.  Left adrenal 6.3 cm myelolipoma.  Patient informed of above. Instructed to f/u with PCP within 4 weeks to discuss need for further surveillance and work up.

## 2024-09-24 NOTE — DISCHARGE NOTE NURSING/CASE MANAGEMENT/SOCIAL WORK - CAREGIVER NAME
1054 ARRIVED IN PACU FROM OR AFTER GENERAL ANESTHESIA SEE FLOW SHEET   1110 DOZES. 1120 AWAKENS SUDDENLY WITH COMPLAINTS OF PAIN AT 9. FENTANYL GIVEN SEE MAR  1124 TORADOL GIVEN PER ORDER DR. BOLDEN SEE MAR  1135 PT. SLEEPY. O2 SATS DRIFTING DOWN PERIODICALLY. O2 STARTED AT 1 L.   1145 DOZES WITH PAIN CONTROLLED AT THIS TIME  1150 DR. BOLDEN AT BEDSIDE TO CHECK ON . PT.   1200 SLEEPS. AWAKENS TO NAME AND DOZES BACK TO SLEEP.    1208 TO SDS. FRIEND AT BEDSIDE.  REPORT TO ARTIE CASTELLON Manfred Traore

## 2024-09-24 NOTE — DISCHARGE NOTE NURSING/CASE MANAGEMENT/SOCIAL WORK - PATIENT PORTAL LINK FT
You can access the FollowMyHealth Patient Portal offered by [facility] by registering at the following website: [email or web address]. By joining [employer]’s FollowMyHealth portal, you will also be able to view your health information using other applications (apps) compatible with our system.

## 2024-09-24 NOTE — CASE MANAGEMENT PROGRESS NOTE - NSCMPROGRESSNOTE_GEN_ALL_CORE
Patient is planned for DC home today. No DC needs identified. CM met with patient to review discharge notice, she verbalized understanding. She is in agreement to DC. Family will transport home. CM remains available.

## 2024-09-24 NOTE — DISCHARGE NOTE PROVIDER - HOSPITAL COURSE
HPI:  MS Traore is a 50 yo F presenting to the ER with fevers. PMH DM2, HLD, varicose veins.   She has been having fevers for the past 2 days and also sore throat and cough. She feels weak all over and also states she has myalgias. Had a headache earlier now resolved, no changes in vision, no neck pain or stiffness. She does have some pain in RUQ worse with palpation of that area, pain is not associated with food intake.   Denies nausea, vomiting, chest pain, SOB, palpitations,  constipation, diarrhea, melena, hematochezia, dysuria. denies any wounds and recent travel. Of note she had a RLE varicose vein ablation about 4 weeks ago, she had some erythema and pain and was prescribed a course of Levaquin last week for cellulitis of which she has 2 days remaining. She states she was being treated for a cellulitis of RLE      (22 Sep 2024 11:50)      ---  HOSPITAL COURSE: Patient admitted to medicine floor for management of fever of unknown origin with weakness, fatigue and sore throat, painting the picture of viral syndrome. Patient was admitted in the setting of meeting SIRS criteria, an elevated lactate and procalcitonin level. Those improved over the course of the admission with fluid administration. The patient did not require abx treatment, has had NGTD on blood cultures and is appropriate to send home without abx coverage at this time. CT and US examines were performed and no acute cause of fever was noted, however a myelolipoma was noted in the L adrenal and the patient will follow up with her PCP for this to determine if further workup is needed, patient is aware of this.     Pt seen and examined on day of discharge, she feels well and her previous complaints on admission have resolved. Patient is medically optimized for discharge to home with close outpatient followup.      PHYSICAL EXAM ON DAY OF DISCHARGE:  T(C): 36.4 (09-24-24 @ 05:24), Max: 36.9 (09-23-24 @ 19:38)  HR: 48 (09-24-24 @ 05:24) (48 - 53)  BP: 100/52 (09-24-24 @ 05:24) (100/52 - 119/62)  RR: 17 (09-24-24 @ 05:24) (17 - 18)  SpO2: 96% (09-24-24 @ 05:24) (94% - 96%)    GENERAL: patient appears well, no acute distress, appropriately interactive  EYES: sclera clear, no exudates  ENMT: oropharynx clear without erythema, no exudates, moist mucous membranes  NECK: supple, soft  LUNGS: good air entry bilaterally, clear to auscultation, symmetric breath sounds, no wheezing or rhonchi appreciated  HEART: soft S1/S2, regular rate and rhythm, no murmurs noted, no lower extremity edema  GASTROINTESTINAL: abdomen is soft, nontender, nondistended, normoactive bowel sounds  INTEGUMENT: good skin turgor, warm skin, appears well perfused  MUSCULOSKELETAL: no clubbing or cyanosis, no obvious deformity  NEUROLOGIC: awake, alert, oriented x3, good muscle tone in all 4 extremities  HEME/LYMPH: no obvious ecchymosis or petechiae         ---  CONSULTANTS:   ID: Dr. Khan    ---  TIME SPENT:  I, the attending physician, was physically present for the key portions of the evaluation and management (E/M) service provided. The total amount of time spent reviewing the hospital notes, laboratory values, imaging findings, assessing/counseling the patient, discussing with consultant physicians, social work, nursing staff was -- minutes    ---  Primary care provider was made aware of plan for discharge:      [  ] NO     [  ] YES   HPI:  MS Traore is a 50 yo F presenting to the ER with fevers. PMH DM2, HLD, varicose veins.   She has been having fevers for the past 2 days and also sore throat and cough. She feels weak all over and also states she has myalgias. Had a headache earlier now resolved, no changes in vision, no neck pain or stiffness. She does have some pain in RUQ worse with palpation of that area, pain is not associated with food intake.   Denies nausea, vomiting, chest pain, SOB, palpitations,  constipation, diarrhea, melena, hematochezia, dysuria. denies any wounds and recent travel. Of note she had a RLE varicose vein ablation about 4 weeks ago, she had some erythema and pain and was prescribed a course of Levaquin last week for cellulitis of which she has 2 days remaining. She states she was being treated for a cellulitis of RLE      (22 Sep 2024 11:50)      ---  HOSPITAL COURSE: Patient admitted to medicine floor for management of fever of unknown origin with weakness, fatigue and sore throat, painting the picture of viral syndrome. Patient was admitted in the setting of meeting SIRS criteria, an elevated lactate and procalcitonin level. Those improved over the course of the admission with fluid administration. The patient did not require abx treatment, has had NGTD on blood cultures and is appropriate to send home without abx coverage at this time. Fever suspected to be drug fever (patient with recent levaquin use) vs viral in nature. CT and US examines were performed and no acute cause of fever was noted, however a myelolipoma was noted in the L adrenal and the patient will follow up with her PCP for this to determine if further workup is needed, patient is aware of this.     Pt seen and examined on day of discharge, she feels well and her previous complaints on admission have resolved. Patient is medically optimized for discharge to home with close outpatient followup.      PHYSICAL EXAM ON DAY OF DISCHARGE:  T(C): 36.4 (09-24-24 @ 05:24), Max: 36.9 (09-23-24 @ 19:38)  HR: 48 (09-24-24 @ 05:24) (48 - 53)  BP: 100/52 (09-24-24 @ 05:24) (100/52 - 119/62)  RR: 17 (09-24-24 @ 05:24) (17 - 18)  SpO2: 96% (09-24-24 @ 05:24) (94% - 96%)    GENERAL: patient appears well, no acute distress, appropriately interactive  EYES: sclera clear, no exudates  ENMT: oropharynx clear without erythema, no exudates, moist mucous membranes  NECK: supple, soft  LUNGS: good air entry bilaterally, clear to auscultation, symmetric breath sounds, no wheezing or rhonchi appreciated  HEART: soft S1/S2, regular rate and rhythm, no murmurs noted, no lower extremity edema  GASTROINTESTINAL: abdomen is soft, nontender, nondistended, normoactive bowel sounds  INTEGUMENT: good skin turgor, warm skin, appears well perfused  MUSCULOSKELETAL: no clubbing or cyanosis, no obvious deformity  NEUROLOGIC: awake, alert, oriented x3, good muscle tone in all 4 extremities  HEME/LYMPH: no obvious ecchymosis or petechiae         ---  CONSULTANTS:   ID: Dr. Khan    ---  TIME SPENT:  I, the attending physician, was physically present for the key portions of the evaluation and management (E/M) service provided. The total amount of time spent reviewing the hospital notes, laboratory values, imaging findings, assessing/counseling the patient, discussing with consultant physicians, social work, nursing staff was  38 minutes    ---

## 2024-09-24 NOTE — PROGRESS NOTE ADULT - ASSESSMENT
MS Troare is a 48 yo F presenting to the ER with fevers. PMH DM2.    Fever: etiology unclear, likely secondary to viral syndrome  -Pt is . reports of bodyaches, sweats, dry cough  -CT scans and RUQ U/S reviewed  -RVP negative  -bcx NTD  -elevated lactate and procalc  -monitor for further fever, trend WBC count  -C/w IVF, monitor off abx  -f/u strep PCR  -ID consulted    DM2  -hold ozempic and jardiance  -ISS    HLD  continue rosuvastatin  Hepatic steatosis: outpatient follow up with PCP    Imaging findings:    Right adnexal 2 cm cyst versus follicle.  Left adrenal 6.3 cm myelolipoma.  Patient informed of above. Instructed to f/u with PCP within 4 weeks to discuss need for further surveillance and work up.     DVT ppx: heparin
MS Traore is a 48 yo F presenting to the ER with fevers. PMH DM2 non insulin dependent, HLD, myelolipoma of L Adrenal.

## 2024-09-24 NOTE — DISCHARGE NOTE NURSING/CASE MANAGEMENT/SOCIAL WORK - NSDCPEFALRISK_GEN_ALL_CORE
For information on Fall & Injury Prevention, visit: [email or web address] OR  [email or web address] OR  https://www.cdc.gov/steadi/patient.html

## 2024-09-24 NOTE — DISCHARGE NOTE PROVIDER - ATTENDING DISCHARGE PHYSICAL EXAMINATION:
T(C): 36.4 (09-24-24 @ 05:24), Max: 36.9 (09-23-24 @ 19:38)  HR: 48 (09-24-24 @ 05:24) (48 - 53)  BP: 100/52 (09-24-24 @ 05:24) (100/52 - 119/62)  RR: 17 (09-24-24 @ 05:24) (17 - 18)  SpO2: 96% (09-24-24 @ 05:24) (94% - 96%)      PHYSICAL EXAM:  GENERAL: NAD  HEENT:  anicteric, moist mucous membranes  CHEST/LUNG:  CTA b/l, no rales, wheezes, or rhonchi  HEART:  RRR, S1, S2  ABDOMEN:  BS+, soft, nontender, nondistended  EXTREMITIES: no edema, cyanosis, or calf tenderness  NERVOUS SYSTEM: answers questions and follows commands appropriately

## 2024-09-24 NOTE — PROGRESS NOTE ADULT - SUBJECTIVE AND OBJECTIVE BOX
*****INCOMPLETE**** *****INCOMPLETE****  *****INCOMPLETE**** *****INCOMPLETE****      Patient is a 49y old  Female who presents with a chief complaint of Fever (23 Sep 2024 14:21)      INTERVAL HPI/OVERNIGHT EVENTS: Patient seen and examined at bedside, no acute events overnight. States she feels well and no longer complaints of sore throat, fevers, chills. Denies N/v/D, CP, SOB    MEDICATIONS  (STANDING):  dextrose 5%. 1000 milliLiter(s) (100 mL/Hr) IV Continuous <Continuous>  dextrose 5%. 1000 milliLiter(s) (50 mL/Hr) IV Continuous <Continuous>  dextrose 50% Injectable 25 Gram(s) IV Push once  dextrose 50% Injectable 25 Gram(s) IV Push once  dextrose 50% Injectable 12.5 Gram(s) IV Push once  glucagon  Injectable 1 milliGRAM(s) IntraMuscular once  heparin   Injectable 5000 Unit(s) SubCutaneous every 8 hours  influenza   Vaccine 0.5 milliLiter(s) IntraMuscular once  insulin lispro (ADMELOG) corrective regimen sliding scale   SubCutaneous three times a day before meals  insulin lispro (ADMELOG) corrective regimen sliding scale   SubCutaneous at bedtime  rosuvastatin 10 milliGRAM(s) Oral at bedtime  sodium chloride 0.9%. 1000 milliLiter(s) (80 mL/Hr) IV Continuous <Continuous>    MEDICATIONS  (PRN):  acetaminophen     Tablet .. 650 milliGRAM(s) Oral every 6 hours PRN Temp greater or equal to 38C (100.4F), Mild Pain (1 - 3)  dextrose Oral Gel 15 Gram(s) Oral once PRN Blood Glucose LESS THAN 70 milliGRAM(s)/deciliter      Allergies    Zosyn (Rash)    Intolerances        REVIEW OF SYSTEMS:  CONSTITUTIONAL: No fever or chills  HEENT:  No headache, no sore throat  RESPIRATORY: No cough, wheezing, or shortness of breath  CARDIOVASCULAR: No chest pain, palpitations  GASTROINTESTINAL: No abd pain, nausea, vomiting, or diarrhea  GENITOURINARY: No dysuria, frequency, or hematuria  NEUROLOGICAL: no focal weakness or dizziness  MUSCULOSKELETAL: no myalgias     Vital Signs Last 24 Hrs  T(C): 36.4 (24 Sep 2024 05:24), Max: 36.9 (23 Sep 2024 19:38)  T(F): 97.5 (24 Sep 2024 05:24), Max: 98.5 (23 Sep 2024 19:38)  HR: 48 (24 Sep 2024 05:24) (48 - 53)  BP: 100/52 (24 Sep 2024 05:24) (100/52 - 119/62)  BP(mean): --  RR: 17 (24 Sep 2024 05:24) (17 - 18)  SpO2: 96% (24 Sep 2024 05:24) (94% - 96%)    Parameters below as of 24 Sep 2024 05:24  Patient On (Oxygen Delivery Method): room air        PHYSICAL EXAM:  GENERAL: NAD  HEENT:  anicteric, moist mucous membranes  CHEST/LUNG:  CTA b/l, no rales, wheezes, or rhonchi  HEART:  RRR, S1, S2  ABDOMEN:  BS+, soft, nontender, nondistended  EXTREMITIES: no edema, cyanosis, or calf tenderness  NERVOUS SYSTEM: answers questions and follows commands appropriately    LABS:                        11.5   7.11  )-----------( 263      ( 24 Sep 2024 07:40 )             36.0     CBC Full  -  ( 24 Sep 2024 07:40 )  WBC Count : 7.11 K/uL  Hemoglobin : 11.5 g/dL  Hematocrit : 36.0 %  Platelet Count - Automated : 263 K/uL  Mean Cell Volume : 86.5 fl  Mean Cell Hemoglobin : 27.6 pg  Mean Cell Hemoglobin Concentration : 31.9 gm/dL  Auto Neutrophil # : 2.92 K/uL  Auto Lymphocyte # : 2.90 K/uL  Auto Monocyte # : 0.81 K/uL  Auto Eosinophil # : 0.34 K/uL  Auto Basophil # : 0.03 K/uL  Auto Neutrophil % : 41.1 %  Auto Lymphocyte % : 40.8 %  Auto Monocyte % : 11.4 %  Auto Eosinophil % : 4.8 %  Auto Basophil % : 0.4 %    24 Sep 2024 07:40    141    |  110    |  11     ----------------------------<  127    3.6     |  25     |  0.59     Ca    8.2        24 Sep 2024 07:40    TPro  x      /  Alb  x      /  TBili  0.6    /  DBili  x      /  AST  x      /  ALT  x      /  AlkPhos  x      24 Sep 2024 07:40    PT/INR - ( 24 Sep 2024 07:40 )   PT: 11.6 sec;   INR: 1.02 ratio         PTT - ( 22 Sep 2024 09:00 )  PTT:28.7 sec  Urinalysis Basic - ( 24 Sep 2024 07:40 )    Color: x / Appearance: x / SG: x / pH: x  Gluc: 127 mg/dL / Ketone: x  / Bili: x / Urobili: x   Blood: x / Protein: x / Nitrite: x   Leuk Esterase: x / RBC: x / WBC x   Sq Epi: x / Non Sq Epi: x / Bacteria: x      CAPILLARY BLOOD GLUCOSE      POCT Blood Glucose.: 123 mg/dL (24 Sep 2024 08:19)  POCT Blood Glucose.: 197 mg/dL (23 Sep 2024 21:29)  POCT Blood Glucose.: 159 mg/dL (23 Sep 2024 17:15)  POCT Blood Glucose.: 233 mg/dL (23 Sep 2024 12:17)        Culture - Blood (collected 09-22-24 @ 06:55)  Source: .Blood Blood-Peripheral  Preliminary Report (09-23-24 @ 11:01):    No growth at 24 hours    Urinalysis with Rflx Culture (collected 09-22-24 @ 06:55)    Culture - Blood (collected 09-22-24 @ 06:40)  Source: .Blood Blood-Peripheral  Preliminary Report (09-23-24 @ 11:01):    No growth at 24 hours        RADIOLOGY & ADDITIONAL TESTS:    Personally reviewed.     Consultant(s) Notes Reviewed:  [x] YES  [ ] NO           Patient is a 49y old  Female who presents with a chief complaint of Fever (23 Sep 2024 14:21)      INTERVAL HPI/OVERNIGHT EVENTS: Patient seen and examined at bedside, no acute events overnight. States she feels well and no longer complaints of sore throat, fevers, chills. Denies N/v/D, CP, SOB    MEDICATIONS  (STANDING):  dextrose 5%. 1000 milliLiter(s) (100 mL/Hr) IV Continuous <Continuous>  dextrose 5%. 1000 milliLiter(s) (50 mL/Hr) IV Continuous <Continuous>  dextrose 50% Injectable 25 Gram(s) IV Push once  dextrose 50% Injectable 25 Gram(s) IV Push once  dextrose 50% Injectable 12.5 Gram(s) IV Push once  glucagon  Injectable 1 milliGRAM(s) IntraMuscular once  heparin   Injectable 5000 Unit(s) SubCutaneous every 8 hours  influenza   Vaccine 0.5 milliLiter(s) IntraMuscular once  insulin lispro (ADMELOG) corrective regimen sliding scale   SubCutaneous three times a day before meals  insulin lispro (ADMELOG) corrective regimen sliding scale   SubCutaneous at bedtime  rosuvastatin 10 milliGRAM(s) Oral at bedtime  sodium chloride 0.9%. 1000 milliLiter(s) (80 mL/Hr) IV Continuous <Continuous>    MEDICATIONS  (PRN):  acetaminophen     Tablet .. 650 milliGRAM(s) Oral every 6 hours PRN Temp greater or equal to 38C (100.4F), Mild Pain (1 - 3)  dextrose Oral Gel 15 Gram(s) Oral once PRN Blood Glucose LESS THAN 70 milliGRAM(s)/deciliter      Allergies    Zosyn (Rash)    Intolerances        REVIEW OF SYSTEMS:  CONSTITUTIONAL: No fever or chills  HEENT:  No headache, no sore throat  RESPIRATORY: No cough, wheezing, or shortness of breath  CARDIOVASCULAR: No chest pain, palpitations  GASTROINTESTINAL: No abd pain, nausea, vomiting, or diarrhea  GENITOURINARY: No dysuria, frequency, or hematuria  NEUROLOGICAL: no focal weakness or dizziness  MUSCULOSKELETAL: no myalgias     Vital Signs Last 24 Hrs  T(C): 36.4 (24 Sep 2024 05:24), Max: 36.9 (23 Sep 2024 19:38)  T(F): 97.5 (24 Sep 2024 05:24), Max: 98.5 (23 Sep 2024 19:38)  HR: 48 (24 Sep 2024 05:24) (48 - 53)  BP: 100/52 (24 Sep 2024 05:24) (100/52 - 119/62)  BP(mean): --  RR: 17 (24 Sep 2024 05:24) (17 - 18)  SpO2: 96% (24 Sep 2024 05:24) (94% - 96%)    Parameters below as of 24 Sep 2024 05:24  Patient On (Oxygen Delivery Method): room air        PHYSICAL EXAM:  GENERAL: NAD  HEENT:  anicteric, moist mucous membranes  CHEST/LUNG:  CTA b/l, no rales, wheezes, or rhonchi  HEART:  RRR, S1, S2  ABDOMEN:  BS+, soft, nontender, nondistended  EXTREMITIES: no edema, cyanosis, or calf tenderness  NERVOUS SYSTEM: answers questions and follows commands appropriately    LABS:                        11.5   7.11  )-----------( 263      ( 24 Sep 2024 07:40 )             36.0     CBC Full  -  ( 24 Sep 2024 07:40 )  WBC Count : 7.11 K/uL  Hemoglobin : 11.5 g/dL  Hematocrit : 36.0 %  Platelet Count - Automated : 263 K/uL  Mean Cell Volume : 86.5 fl  Mean Cell Hemoglobin : 27.6 pg  Mean Cell Hemoglobin Concentration : 31.9 gm/dL  Auto Neutrophil # : 2.92 K/uL  Auto Lymphocyte # : 2.90 K/uL  Auto Monocyte # : 0.81 K/uL  Auto Eosinophil # : 0.34 K/uL  Auto Basophil # : 0.03 K/uL  Auto Neutrophil % : 41.1 %  Auto Lymphocyte % : 40.8 %  Auto Monocyte % : 11.4 %  Auto Eosinophil % : 4.8 %  Auto Basophil % : 0.4 %    24 Sep 2024 07:40    141    |  110    |  11     ----------------------------<  127    3.6     |  25     |  0.59     Ca    8.2        24 Sep 2024 07:40    TPro  x      /  Alb  x      /  TBili  0.6    /  DBili  x      /  AST  x      /  ALT  x      /  AlkPhos  x      24 Sep 2024 07:40    PT/INR - ( 24 Sep 2024 07:40 )   PT: 11.6 sec;   INR: 1.02 ratio         PTT - ( 22 Sep 2024 09:00 )  PTT:28.7 sec  Urinalysis Basic - ( 24 Sep 2024 07:40 )    Color: x / Appearance: x / SG: x / pH: x  Gluc: 127 mg/dL / Ketone: x  / Bili: x / Urobili: x   Blood: x / Protein: x / Nitrite: x   Leuk Esterase: x / RBC: x / WBC x   Sq Epi: x / Non Sq Epi: x / Bacteria: x      CAPILLARY BLOOD GLUCOSE      POCT Blood Glucose.: 123 mg/dL (24 Sep 2024 08:19)  POCT Blood Glucose.: 197 mg/dL (23 Sep 2024 21:29)  POCT Blood Glucose.: 159 mg/dL (23 Sep 2024 17:15)  POCT Blood Glucose.: 233 mg/dL (23 Sep 2024 12:17)        Culture - Blood (collected 09-22-24 @ 06:55)  Source: .Blood Blood-Peripheral  Preliminary Report (09-23-24 @ 11:01):    No growth at 24 hours    Urinalysis with Rflx Culture (collected 09-22-24 @ 06:55)    Culture - Blood (collected 09-22-24 @ 06:40)  Source: .Blood Blood-Peripheral  Preliminary Report (09-23-24 @ 11:01):    No growth at 24 hours        RADIOLOGY & ADDITIONAL TESTS:    Personally reviewed.     Consultant(s) Notes Reviewed:  [x] YES  [ ] NO

## 2024-09-27 LAB
CULTURE RESULTS: SIGNIFICANT CHANGE UP
CULTURE RESULTS: SIGNIFICANT CHANGE UP
SPECIMEN SOURCE: SIGNIFICANT CHANGE UP
SPECIMEN SOURCE: SIGNIFICANT CHANGE UP

## 2024-10-09 ENCOUNTER — TRANSCRIPTION ENCOUNTER (OUTPATIENT)
Age: 49
End: 2024-10-09

## 2024-10-10 ENCOUNTER — APPOINTMENT (OUTPATIENT)
Dept: INTERNAL MEDICINE | Facility: CLINIC | Age: 49
End: 2024-10-10
Payer: COMMERCIAL

## 2024-10-10 VITALS
HEART RATE: 58 BPM | OXYGEN SATURATION: 99 % | WEIGHT: 208 LBS | RESPIRATION RATE: 16 BRPM | BODY MASS INDEX: 34.66 KG/M2 | HEIGHT: 65 IN | SYSTOLIC BLOOD PRESSURE: 118 MMHG | DIASTOLIC BLOOD PRESSURE: 70 MMHG | TEMPERATURE: 97.8 F

## 2024-10-10 DIAGNOSIS — M62.830 MUSCLE SPASM OF BACK: ICD-10-CM

## 2024-10-10 PROCEDURE — 99213 OFFICE O/P EST LOW 20 MIN: CPT | Mod: 25

## 2024-10-10 PROCEDURE — 90656 IIV3 VACC NO PRSV 0.5 ML IM: CPT

## 2024-10-10 PROCEDURE — G0008: CPT

## 2024-10-10 RX ORDER — CYCLOBENZAPRINE HYDROCHLORIDE 5 MG/1
5 TABLET, FILM COATED ORAL
Qty: 14 | Refills: 0 | Status: ACTIVE | COMMUNITY
Start: 2024-10-10 | End: 1900-01-01

## 2024-10-15 ENCOUNTER — RX RENEWAL (OUTPATIENT)
Age: 49
End: 2024-10-15

## 2024-11-15 ENCOUNTER — APPOINTMENT (OUTPATIENT)
Dept: OBGYN | Facility: CLINIC | Age: 49
End: 2024-11-15

## 2024-12-04 ENCOUNTER — RX RENEWAL (OUTPATIENT)
Age: 49
End: 2024-12-04

## 2024-12-05 ENCOUNTER — RX RENEWAL (OUTPATIENT)
Age: 49
End: 2024-12-05

## 2024-12-30 ENCOUNTER — RX RENEWAL (OUTPATIENT)
Age: 49
End: 2024-12-30